# Patient Record
Sex: FEMALE | Race: WHITE | Employment: OTHER | URBAN - METROPOLITAN AREA
[De-identification: names, ages, dates, MRNs, and addresses within clinical notes are randomized per-mention and may not be internally consistent; named-entity substitution may affect disease eponyms.]

---

## 2021-01-19 PROBLEM — F10.929 ALCOHOLIC INTOXICATION WITH COMPLICATION (HCC): Status: ACTIVE | Noted: 2021-01-19

## 2021-01-19 PROBLEM — E87.6 HYPOKALEMIA: Status: ACTIVE | Noted: 2021-01-19

## 2021-01-19 PROBLEM — U07.1 COVID-19 VIRUS INFECTION: Status: ACTIVE | Noted: 2021-01-19

## 2021-01-19 PROBLEM — R45.851 SUICIDAL IDEATION: Status: ACTIVE | Noted: 2021-01-19

## 2021-01-19 NOTE — ED PROVIDER NOTES
Patient Seen in: [facility] Emergency Department      History   Patient presents with:  Eval-P  Eval-D    Stated Complaint: eval D    HPI/Subjective:   HPI    53-year-old female with history of anxiety, depression, alcohol abuse/dependence presents is awake, alert, well-appearing, in no acute distress. HEENT: Pupils equal round reactive to light, extraocular muscles are intact, there is no scleral icterus. Mucous membranes are moderately dry, oropharynx is clear, uvula midline.     Scalp is atraumat - Abnormal; Notable for the following components:    Lymphocyte Absolute 0.88 (*)     All other components within normal limits   LIPASE - Normal   MAGNESIUM - Normal   CBC WITH DIFFERENTIAL WITH PLATELET    Narrative:      The following orders were created

## 2021-01-19 NOTE — ED NOTES
All of the patients belongings were removed  They were bagged/labeled and put into 2 smart lock bags  RN removed the belongings  Bag #1 ! [id number]  This bag includes   #[employer] bag-bag was not gone through  Bag #2 #[id number]  This bag includes   #s

## 2021-01-19 NOTE — ED INITIAL ASSESSMENT (HPI)
PT TO ED FROM HOME WITH C/O ALCOHOL ABUSE, PT STATES HE HASN'T EATEN IN APPROX 2 MONTHS, AND LAST DRINK WAS THIS AM; VODKA. PT STATES SHE DRINKS A \"BIG BOTTLE\" OF VODKA A WEEK. HX OF DETOX PROGRAMS, DENIES SEIZURES.

## 2021-01-20 ENCOUNTER — APPOINTMENT (OUTPATIENT)
Dept: GENERAL RADIOLOGY | Facility: HOSPITAL | Age: 76
DRG: 896 | End: 2021-01-20
Attending: INTERNAL MEDICINE
Payer: MEDICARE

## 2021-01-20 PROCEDURE — 71045 X-RAY EXAM CHEST 1 VIEW: CPT | Performed by: INTERNAL MEDICINE

## 2021-01-20 NOTE — PLAN OF CARE
A/O x 3-4. Forgetful at times.  1:1 sitter for SI, per pt not planning on acting on thoughts, but feeling depressed due to drinking binge/alcohol dependency  CIWA Q 2- Ativan PRN scoring equal or over 7  C/O headache and sore throat  Psychiatry to see  [facility]  - Evaluate fluid balance, assess for edema, trend weights  Outcome: Progressing  Goal: Absence of cardiac arrhythmias or at baseline  Description: INTERVENTIONS:  - Continuous cardiac monitoring, monitor vital signs, obtain 12 lead EKG if indicated as a liaison between patient and family and health care team  - Initiate Consults from Ethics, [address] or initiate [address] as is appropriate  Outcome: Progressing     Problem: SELF HARM  Goal: Patient will be protected from self-harm

## 2021-01-20 NOTE — PROGRESS NOTES
PSYCH CONSULT    Date of Admission: 1/19/21  Date of Consult: 1/20/21  Reason for Consultation: Suicide precautions    Impression:  Primary Psychiatric Diagnosis:  Passive suicidal ideation while intoxicated on 1/19/21.      Anxiety and depressive disorder

## 2021-01-20 NOTE — CONSULTS
[facility]  Report of Psychiatric Consultation    Kavya Akashcheco Patient Status:  Inpatient    1945 MRN [id number]   [facility] 4NW-A Attending Aleksandra Rebollar MD   Hosp Day # 1 PCP CHAVO MCHUGH, FREDRICK     Date of Admission: 21 Ativan and was drowsy and unable to give a lot of history. Psychiatry Review Systems: No voices or visions. No elevated mood, racing thoughts, decreased need for sleep, grandiose thoughts. Past Psychiatric History: Anxiety and depressive disorder. logical  Thought content: no delusions  Perceptions: no hallucinations  Associations: Intact    Orientation: oriented person, place and oriented situation  Attention and Concentration:   fair  Memory:  intact recent and intact remote  Language: Intact preston

## 2021-01-20 NOTE — PROGRESS NOTES
NURSING ADMISSION NOTE      Patient admitted via Cart  Oriented to room. Safety precautions initiated. Bed in low position. Call light in reach.     Completed admission assessment with patient  1:1 observation in place  Observing patient for ETOH wit

## 2021-01-20 NOTE — H&P
[facility]  History and Physical     Travis Ghosh Patient Status:  Emergency    1945 MRN [id number]   Location [address] Attending Milymichelle Enrique, [address] [facility] Day # 0 PCP FREDRICK TONY MD     Chief Complaint: anxiet comprehensive 14 point review of systems was completed. Pertinent positives and negatives noted in the HPI.     Physical Exam:    /71   Pulse 98   Temp 97.8 °F (36.6 °C) (Temporal)   Resp 25   Ht 5' 9\" (1.753 m)   Wt 145 lb (65.8 kg)   SpO2 91% patient    Rachelle Hall MD  1/19/2021

## 2021-01-20 NOTE — PLAN OF CARE
Received pt at shift change. Pt has had labile CIWA numbers and treated appropriately. Pt has been up to [facility] tolerated well. Encouraged to drink more fluids, verbalizes understanding.  Asked pt if she has a plan to commit suicide, she denies, stated she has demonstrate appropriate behavior  Description: INTERVENTIONS:  - Treat underlying conditions and offer medication as ordered  - Educate regarding involuntary admission procedures and rules  - Contain excessive/inappropriate behavior per unit and hospital p

## 2021-01-20 NOTE — DIETARY NOTE
[address]     Admitting diagnosis:  Suicidal ideation [R45.851]  Hypokalemia [J65.4]  Alcoholic intoxication with complication ([facility] [address] [F63.431]  COVID-19 virus infection [U07.1]    Ht: 175.3 cm (5' 9\")  Wt: 65.

## 2021-01-21 NOTE — PROGRESS NOTES
[facility]  Progress Note    Valerie Resendiz Patient Status:  Inpatient    1945 MRN [id number]   [facility] 4NW-A Attending Elba Whatley MD   Hosp Day # 2 PCP CHAVO MCHUGH, FREDRICK     CC: Alcohol withdrawal, anxiety    SUBJECTIVE: Fe ALB 3.0 01/21/2021    ALKPHO 58 01/21/2021    BILT 0.5 01/21/2021    TP 6.2 01/21/2021    AST 33 01/21/2021    ALT 24 01/21/2021    DDIMER 0.55 01/21/2021    CRP 2.53 01/21/2021     COVID-19 Lab Results    COVID-19  Lab Results   Component Value Date    CO anxiety  2. COVID-19 pneumonia with hypoxia  1. Follow Covid inflammatory markers-ferritin and CRP higher than yesterday  2. Frequent proning  3. Incentive spirometry every hour while awake  4. ID consult  5.  Patient currently on 1 L of oxygen by nasal can

## 2021-01-21 NOTE — COVID NURSING ASSESSMENT
COVID-19 Daily Discharge Readiness-Nursing    O2 Sat at Rest:    96 %   O2 Sat with Exertion:    % on    liters   Temperature max from last 24 hrs: Temp (24hrs), Av °F (36.7 °C), Min:97.5 °F (36.4 °C), Max:98.2 °F (36.8 °C)    Inflammatory Markers:   R

## 2021-01-21 NOTE — PROGRESS NOTES
[facility]  Progress Note    Amy Lowe Patient Status:  Inpatient    1945 MRN [id number]   [facility] 4NW-A Attending Josi Motley MD   Hosp Day # 1 PCP CHAVO MCHUGH, FREDRICK     CC: Alcohol withdrawal, anxiety    SUBJECTIVE:  A 01/19/2021    ALT 28 01/19/2021    PTT 27.9 01/19/2021    INR 1.01 01/19/2021    PTP 13.6 01/19/2021    DDIMER 0.95 01/20/2021    CRP 1.10 01/20/2021    MG 1.6 01/20/2021    TROP <0.045 01/20/2021    CK 73 01/19/2021     COVID-19 Lab Results    COVID-19  L resolved          Quality:  · DVT Prophylaxis: SCD, subcutaneous Lovenox  · CODE status: full  · [place]: no  · Central line: no    Will the patient be referred to [facility] on discharge?:  To be decided  Estimated date of discharge:  To be decided  Discharge is de

## 2021-01-21 NOTE — PHYSICAL THERAPY NOTE
PHYSICAL THERAPY QUICK EVALUATION - INPATIENT    Room Number: 447/455-H  Evaluation Date: 1/21/2021  Presenting Problem: +COVID19, ETOH withdrawal  Physician Order: PT Eval and Treat    Problem List  Principal Problem:    Alcoholic intoxication with comp Moving from lying on back to sitting on the side of the bed?: None   How much help from another person does the patient currently need. ..   -   Moving to and from a bed to a chair (including a wheelchair)?: None   -   Need to walk in hospital room?: A Sonu previous, functional level

## 2021-01-21 NOTE — PLAN OF CARE
Guadalupe Riedel   COVID-19 Daily Discharge Readiness-Nursing    O2 Sat at Rest:    96 %   O2 Sat with Exertion:  92  % on  RA  Temperature max from last 24 hrs: Temp (24hrs), Av.2 °F (36.8 °C), Min:98 °F (36.7 °C), Max:98.4 °F (36.9 °C)    Inflammatory Markers:   Rec

## 2021-01-22 NOTE — COVID NURSING ASSESSMENT
COVID-19 Daily Discharge Readiness-Nursing    O2 Sat at Rest:     %   O2 Sat with Exertion:    % on    liters   Temperature max from last 24 hrs: Temp (24hrs), Av.2 °F (36.8 °C), Min:97.7 °F (36.5 °C), Max:98.8 °F (37.1 °C)    Inflammatory Markers:   R

## 2021-01-22 NOTE — COVID NURSING ASSESSMENT
COVID-19 Daily Discharge Readiness-Nursing    O2 Sat at Rest:     96%  On room air  O2 Sat with Exertion:    % on    liters   Temperature max from last 24 hrs: Temp (24hrs), Av.2 °F (36.8 °C), Min:97.7 °F (36.5 °C), Max:98.8 °F (37.1 °C)    Inflammator No data recorded     Safety Concerns: SI   Barriers to Discharge: mental state

## 2021-01-22 NOTE — PROGRESS NOTES
[facility]  Report of Psychiatric Progress Note    Alicia Bloch Patient Status:  Inpatient    1945 MRN [id number]   [facility] 4NW-A Attending Isela Bardales MD   Hosp Day # 3 PCP CHAVO MCHUGH, FREDRICK     Date of Admission: 21 incidental finding of her COVID-19 infection. She was intoxicated () when she had the suicidal ideation. She has NO suicidal ideation at this time. She admits to drinking 4 large glasses of vodka daily, but would not quantify further.  She drinks wit SURGERY Left      No family history on file. reports that she has quit smoking. She has never used smokeless tobacco. She reports current alcohol use of about 3.0 standard drinks of alcohol per week. She reports that she does not use drugs.     Allergies: 01/22/2021    HCT 39.4 01/22/2021    .0 01/22/2021    CREATSERUM 0.69 01/22/2021    BUN 6 01/22/2021     01/22/2021    K 3.6 01/22/2021     01/22/2021    CO2 31.0 01/22/2021    GLU 87 01/22/2021    CA 9.0 01/22/2021    ALB 3.1 01/22/2021

## 2021-01-22 NOTE — OCCUPATIONAL THERAPY NOTE
OCCUPATIONAL THERAPY QUICK EVALUATION - INPATIENT    Room Number: 908/600-X  Evaluation Date: 1/22/2021     Type of Evaluation: Quick Eval  Presenting Problem: ETOH withdrawal, COVID-19    Physician Order: IP Consult to Occupational Therapy  Reason for The strength is within functional limits     NEUROLOGICAL FINDINGS                   ACTIVITY TOLERANCE                         O2 SATURATIONS                ACTIVITIES OF DAILY LIVING ASSESSMENT  AM-PAC ‘6-Clicks’ Inpatient Daily Activity Short Form   How muc Profile/Medical History  LOW - Brief history including review of medical or therapy records    Specific performance deficits impacting engagement in ADL/IADL  LOW  1 - 3 performance deficits    Client Assessment/Performance Deficits  LOW - No comorbidities

## 2021-01-22 NOTE — PROGRESS NOTES
[facility]  Progress Note    [facility]    1945 MRN [id number]   [facility] 4NW-A Attending Emanuel Romero MD   Hosp Day # 3 PCP CHAVO MCHUGH, FREDRICK     CC: Alcohol withdrawal, anxiety    SUBJECTIVE: Pa Mood and affect appears normal      Labs:   Lab Results   Component Value Date    WBC 6.2 01/22/2021    HGB 13.0 01/22/2021    HCT 39.4 01/22/2021    .0 01/22/2021    CREATSERUM 0.69 01/22/2021    BUN 6 01/22/2021     01/22/2021    K 3.6 01/22 depression disorder with suicidal ideation  1. Patient off CIWA protocol  2.  Suicide precautions discontinued by psychiatry Dr. Alicja Jones again on 1/22/2021 as no suicidal ideations at this time per psychiatrist.  3. Seen by psych Dr. Alicja Jones on consult,   Lexapro

## 2021-01-22 NOTE — PROGRESS NOTES
[facility]  Report of Psychiatric Consultation    Ness Mishra Patient Status:  Inpatient    1945 MRN [id number]   [facility] 4NW-A Attending Raz Murillo MD   Hosp Day # 2 PCP CHAVO MCHUGH, FREDRICK     Date of Admission: 21 anxiety and depression. She just got Ativan and was drowsy and unable to give a lot of history.      Interval Hx:  1/21/21- She feels ANXIOUS about having COVID and worries about her age and \"poor immune system because I don't eat healthy\" worsening her p mg, Oral, Q1H PRN **OR** LORazepam (ATIVAN) injection 2 mg, 2 mg, Intravenous, Q1H PRN    Review of Systems   Constitutional: Positive for malaise/fatigue. Psychiatric/Behavioral: Positive for depression. The patient is nervous/anxious.       Mental Statu

## 2021-01-23 NOTE — COVID NURSING ASSESSMENT
Travis Gonzales   COVID-19 Daily Discharge Readiness-Nursing    O2 Sat at Rest:    94 %   O2 Sat with Exertion:    % on    liters   Temperature max from last 24 hrs: Temp (24hrs), Av.5 °F (36.9 °C), Min:97.9 °F (36.6 °C), Max:99.2 °F (37.3 °C)    Inflammatory Markers

## 2021-01-23 NOTE — PLAN OF CARE
COVID-19 Daily Discharge Readiness-Nursing    O2 Sat at Rest: 98    %   O2 Sat with Exertion: 98    % on 0    liters   Temperature max from last 24 hrs: Temp (24hrs), Av.6 °F (37 °C), Min:98 °F (36.7 °C), Max:99.2 °F (37.3 °C)    Inflammatory Markers:

## 2021-01-23 NOTE — CONSULTS
INFECTIOUS DISEASE CONSULTATION    Travis Ghosh Patient Status:  Inpatient    1945 MRN [id number]   [facility] 4NW-A Attending Liana Grover MD   Hosp Day # 4 PCP CHAVO MCHUGH, [facility] 1/19/2021 ), Disp: 30 tablet, Rfl: 5    •  TraZODone HCl (DESYREL) 50 MG Oral Tab, 1 tablet as needed. , Disp: , Rfl: 0        Review of Systems:    A comprehensive 10 point review of systems was completed.   Pertinent positives and negatives noted in the th esophagitis presence not specified     Lack of appetite     Alcoholic intoxication with complication ([facility] [address]     [id number]     Suicidal ideation     Hypokalemia     COVID-19 virus infection     Substance induced mood disorder (HCC)     Alcohol withdrawal synd

## 2021-01-23 NOTE — PROGRESS NOTES
[facility]  Progress Note    Jenna Mercado Patient Status:  Inpatient    1945 MRN [id number]   [facility] 4NW-A Attending Agustin Rodriguez MD   Hosp Day # 4 PCP CHAVO MCHUGH, FREDRICK     CC: Alcohol withdrawal, anxiety    SUBJECTIVE: Co ALB 3.3 01/23/2021    ALKPHO 67 01/23/2021    BILT 0.7 01/23/2021    TP 7.0 01/23/2021    AST 34 01/23/2021    ALT 26 01/23/2021     COVID-19 Lab Results    COVID-19  Lab Results   Component Value Date    COVID19 Detected (A) 01/19/2021       Pro-Calcit Covid inflammatory markers-ferritin and CRP higher than yesterday  2. Frequent proning  3. Incentive spirometry every hour while awake  4. ID consult  5. Patient currently on room air  6. Blood culture with no growth  3. Hypokalemia on admission  1.  Replac

## 2021-01-24 NOTE — PROGRESS NOTES
[facility]                INFECTIOUS DISEASE PROGRESS NOTE    Deep Ríos Patient Status:  Inpatient    1945 MRN [id number]   [facility] 4NW-A Attending Leilani Sharpe MD   Hosp Day # 5 PCP CHAVO MCHUGH, Marco Mei [facility] Encounter on 01/19/21   1.  BLOOD CULTURE     Status: None (Preliminary result)    Collection Time: 01/19/21 11:18 PM    Specimen: Blood,peripheral   Result Value Ref Range    Blood Culture Result No Growth 4 Days N/A

## 2021-01-24 NOTE — COVID NURSING ASSESSMENT
Jane Samuels   COVID-19 Daily Discharge Readiness-Nursing    O2 Sat at Rest: 98     %   O2 Sat with Exertion:   95 % on    RA  Temperature max from last 24 hrs: Temp (24hrs), Av.9 °F (36.6 °C), Min:97.1 °F (36.2 °C), Max:98.7 °F (37.1 °C)    Inflammatory Markers:

## 2021-01-24 NOTE — COVID NURSING ASSESSMENT
COVID-19 Daily Discharge Readiness-Nursing    O2 Sat at Rest:     96% room air  O2 Sat with Exertion:    % on    liters   Temperature max from last 24 hrs: Temp (24hrs), Av.2 °F (36.8 °C), Min:97.9 °F (36.6 °C), Max:98.7 °F (37.1 °C)    Inflammatory Ma

## 2021-01-24 NOTE — PROGRESS NOTES
[facility]  Progress Note     Ness Rifnatalie Patient Status:  Inpatient    1945 MRN [id number]   [facility] 4NW-A Attending Lakisha Estrada MD   Hosp Day # 5 PCP FREDRICK TONY MD     Chief Complaint: confusion    S: Patient feels data reviewed in Epic. Medications:   • chlordiazePOXIDE HCl  10 mg Oral TID   • escitalopram  20 mg Oral Daily   • enoxaparin  40 mg Subcutaneous Daily       ASSESSMENT / PLAN:     1. Alcohol abuse  2. alcohol intoxication   3.  alcohol withdrawal;  4.

## 2021-01-25 NOTE — COVID NURSING ASSESSMENT
COVID-19 Daily Discharge Readiness-Nursing    O2 Sat at Rest:   96  % on RA  O2 Sat with Exertion:    % on    liters   Temperature max from last 24 hrs: Temp (24hrs), Av °F (36.7 °C), Min:97.1 °F (36.2 °C), Max:98.7 °F (37.1 °C)    Inflammatory Markers

## 2021-01-25 NOTE — PROGRESS NOTES
[facility]  Report of Psychiatric Progress Note    Latasha Nettles Patient Status:  Inpatient    1945 MRN [id number]   [facility] 4NW-A Attending Coy Winters MD   Hosp Day # 6 PCP CHAVO MCHUGH, FREDRICK     Date of Admission: 21 She also smokes cannabis. She feels depressed and has low energy and motivation and feelings of hopelessness. She is on Lexapro for her anxiety and depression. She just got Ativan and was drowsy and unable to give a lot of history.      Interval Hx:  1/21/2 of about 3.0 standard drinks of alcohol per week. She reports that she does not use drugs.     Allergies:  No Known Allergies    Medications:    Current Facility-Administered Medications:   •  Calcium Carbonate Antacid (TUMS) chewable tab 500 mg, 500 mg, Or 6.7 01/25/2021    AST 25 01/25/2021    ALT 26 01/25/2021

## 2021-01-25 NOTE — COVID NURSING ASSESSMENT
COVID-19 Daily Discharge Readiness-Nursing    O2 Sat at Rest:     %   O2 Sat with Exertion:    % on    liters   Temperature max from last 24 hrs: Temp (24hrs), Av.2 °F (36.8 °C), Min:98 °F (36.7 °C), Max:98.6 °F (37 °C)    Inflammatory Markers:   Recen

## 2021-01-26 NOTE — COVID NURSING ASSESSMENT
COVID-19 Daily Discharge Readiness-Nursing    O2 Sat at Rest:   96  % on RA   O2 Sat with Exertion:    % on    liters   Temperature max from last 24 hrs: Temp (24hrs), Av.1 °F (36.7 °C), Min:97.5 °F (36.4 °C), Max:98.3 °F (36.8 °C)    Inflammatory Beacher Settles

## 2021-01-26 NOTE — PLAN OF CARE
Pt. A&O x4[place] [id number] Erika Guzman to discharge per MD's. Prescriptions filled by meds to beds. Discharge instructions given. All resources for outpatient therapy given to patient. Patient verbalized understanding. All questions answered. IV removed.  All be concerns of patient and caregivers  - Reduce environmental stimuli, as able  - Instruct patient/family in relaxation techniques, as appropriate  - Assess for spiritual and psychosocial needs and initiate Spiritual Care or Behavioral Health consult as neede

## 2021-01-26 NOTE — PROGRESS NOTES
[facility]  Report of Psychiatric Progress Note    Ericka Stagers Patient Status:  Inpatient    1945 MRN [id number]   [facility] 4NW-A Attending Dalila Horner MD   Hosp Day # 7 PCP CHAVO MCHUGH, FREDRICK     Date of Admission: 21 daily, but would not quantify further. She drinks with her boyfriend of 30 yrs. She wants to quit drinking because it \"it is getting to much\". She also smokes cannabis. She feels depressed and has low energy and motivation and feelings of hopelessness.  [employer] [place] members. Social and Developmental History: She has a boyfriend of 30 yrs.      Past Medical History:   Diagnosis Date   • Anxiety    • Depression      Past Surgical History:   Procedure Laterality Date   • BREAST SURGERY     •      • HIP SURGERY fair now  Judgment: fair now

## 2021-01-28 NOTE — DISCHARGE SUMMARY
[facility]  DISCHARGE SUMMARY     Raven Lr Patient Status:  Inpatient    1945 MRN [id number]   [facility] 4NW-A Attending No att. providers found   [facility] Day # 7 PCP FREDRICK TONY MD     Date of Admission: 2021  Date hours as needed for Nausea.    Quantity: 30 tablet  Refills: 0        CHANGE how you take these medications      Instructions Prescription details   escitalopram 20 MG Tabs  Commonly known as: LEXAPRO  What changed:   · medication strength  · how much to ta chart    Cali Medeiros MD    Time spent:  > 30 minutes

## 2024-03-25 PROBLEM — F10.10 ALCOHOL ABUSE: Status: ACTIVE | Noted: 2024-03-25

## 2024-03-25 PROBLEM — K92.1 MELANOTIC STOOLS: Status: ACTIVE | Noted: 2024-03-25

## 2024-03-26 ENCOUNTER — APPOINTMENT (OUTPATIENT)
Dept: ULTRASOUND IMAGING | Facility: HOSPITAL | Age: 79
End: 2024-03-26
Attending: INTERNAL MEDICINE
Payer: MEDICARE

## 2024-03-26 PROCEDURE — 76700 US EXAM ABDOM COMPLETE: CPT | Performed by: INTERNAL MEDICINE

## 2025-01-07 ENCOUNTER — APPOINTMENT (OUTPATIENT)
Dept: CT IMAGING | Age: 80
End: 2025-01-07
Attending: EMERGENCY MEDICINE
Payer: MEDICARE

## 2025-01-07 ENCOUNTER — HOSPITAL ENCOUNTER (INPATIENT)
Facility: HOSPITAL | Age: 80
LOS: 10 days | Discharge: HOME HEALTH | End: 2025-01-17
Attending: EMERGENCY MEDICINE | Admitting: HOSPITALIST
Payer: MEDICARE

## 2025-01-07 ENCOUNTER — APPOINTMENT (OUTPATIENT)
Dept: GENERAL RADIOLOGY | Age: 80
End: 2025-01-07
Attending: EMERGENCY MEDICINE
Payer: MEDICARE

## 2025-01-07 DIAGNOSIS — D64.9 ANEMIA, UNSPECIFIED TYPE: ICD-10-CM

## 2025-01-07 DIAGNOSIS — S09.8XXA BLUNT HEAD TRAUMA, INITIAL ENCOUNTER: ICD-10-CM

## 2025-01-07 DIAGNOSIS — K92.2 GASTROINTESTINAL HEMORRHAGE, UNSPECIFIED GASTROINTESTINAL HEMORRHAGE TYPE: Primary | ICD-10-CM

## 2025-01-07 LAB
ALBUMIN SERPL-MCNC: 3.9 G/DL (ref 3.2–4.8)
ALBUMIN/GLOB SERPL: 1.5 {RATIO} (ref 1–2)
ALP LIVER SERPL-CCNC: 60 U/L
ALT SERPL-CCNC: 10 U/L
AMMONIA PLAS-MCNC: <10 UMOL/L (ref 11–32)
ANION GAP SERPL CALC-SCNC: 7 MMOL/L (ref 0–18)
ANTIBODY SCREEN: NEGATIVE
APTT PPP: 24.6 SECONDS (ref 23–36)
AST SERPL-CCNC: 23 U/L (ref ?–34)
BASOPHILS # BLD AUTO: 0.01 X10(3) UL (ref 0–0.2)
BASOPHILS NFR BLD AUTO: 0.2 %
BILIRUB SERPL-MCNC: 0.4 MG/DL (ref 0.2–1.1)
BUN BLD-MCNC: 10 MG/DL (ref 9–23)
CALCIUM BLD-MCNC: 9.5 MG/DL (ref 8.7–10.4)
CHLORIDE SERPL-SCNC: 107 MMOL/L (ref 98–112)
CO2 SERPL-SCNC: 27 MMOL/L (ref 21–32)
CREAT BLD-MCNC: 0.85 MG/DL
DEPRECATED HBV CORE AB SER IA-ACNC: 9 NG/ML
EGFRCR SERPLBLD CKD-EPI 2021: 71 ML/MIN/1.73M2 (ref 60–?)
EOSINOPHIL # BLD AUTO: 0.06 X10(3) UL (ref 0–0.7)
EOSINOPHIL NFR BLD AUTO: 1 %
ERYTHROCYTE [DISTWIDTH] IN BLOOD BY AUTOMATED COUNT: 15.3 %
ETHANOL SERPL-MCNC: <3 MG/DL (ref ?–3)
GLOBULIN PLAS-MCNC: 2.6 G/DL (ref 2–3.5)
GLUCOSE BLD-MCNC: 142 MG/DL (ref 70–99)
HCT VFR BLD AUTO: 23.7 %
HGB BLD-MCNC: 6.8 G/DL
HGB BLD-MCNC: 7.4 G/DL
IMM GRANULOCYTES # BLD AUTO: 0.01 X10(3) UL (ref 0–1)
IMM GRANULOCYTES NFR BLD: 0.2 %
INR BLD: 1.08 (ref 0.8–1.2)
IRON SATN MFR SERPL: 2 %
IRON SERPL-MCNC: 8 UG/DL
LYMPHOCYTES # BLD AUTO: 1.11 X10(3) UL (ref 1–4)
LYMPHOCYTES NFR BLD AUTO: 19.2 %
MCH RBC QN AUTO: 26.4 PG (ref 26–34)
MCHC RBC AUTO-ENTMCNC: 31.2 G/DL (ref 31–37)
MCV RBC AUTO: 84.6 FL
MONOCYTES # BLD AUTO: 0.46 X10(3) UL (ref 0.1–1)
MONOCYTES NFR BLD AUTO: 8 %
NEUTROPHILS # BLD AUTO: 4.12 X10 (3) UL (ref 1.5–7.7)
NEUTROPHILS # BLD AUTO: 4.12 X10(3) UL (ref 1.5–7.7)
NEUTROPHILS NFR BLD AUTO: 71.4 %
OSMOLALITY SERPL CALC.SUM OF ELEC: 293 MOSM/KG (ref 275–295)
PLATELET # BLD AUTO: 290 10(3)UL (ref 150–450)
POTASSIUM SERPL-SCNC: 3.1 MMOL/L (ref 3.5–5.1)
PROT SERPL-MCNC: 6.5 G/DL (ref 5.7–8.2)
PROTHROMBIN TIME: 13.8 SECONDS (ref 11.6–14.8)
RBC # BLD AUTO: 2.8 X10(6)UL
RH BLOOD TYPE: POSITIVE
RH BLOOD TYPE: POSITIVE
SODIUM SERPL-SCNC: 141 MMOL/L (ref 136–145)
TOTAL IRON BINDING CAPACITY: 347 UG/DL (ref 250–425)
TRANSFERRIN SERPL-MCNC: 270 MG/DL (ref 250–380)
TROPONIN I SERPL HS-MCNC: <3 NG/L
WBC # BLD AUTO: 5.8 X10(3) UL (ref 4–11)

## 2025-01-07 PROCEDURE — 99223 1ST HOSP IP/OBS HIGH 75: CPT | Performed by: INTERNAL MEDICINE

## 2025-01-07 PROCEDURE — 70450 CT HEAD/BRAIN W/O DYE: CPT | Performed by: EMERGENCY MEDICINE

## 2025-01-07 PROCEDURE — 30233N1 TRANSFUSION OF NONAUTOLOGOUS RED BLOOD CELLS INTO PERIPHERAL VEIN, PERCUTANEOUS APPROACH: ICD-10-PCS | Performed by: HOSPITALIST

## 2025-01-07 PROCEDURE — 71045 X-RAY EXAM CHEST 1 VIEW: CPT | Performed by: EMERGENCY MEDICINE

## 2025-01-07 PROCEDURE — 74177 CT ABD & PELVIS W/CONTRAST: CPT | Performed by: EMERGENCY MEDICINE

## 2025-01-07 RX ORDER — POTASSIUM CHLORIDE 1500 MG/1
20 TABLET, EXTENDED RELEASE ORAL ONCE
Status: COMPLETED | OUTPATIENT
Start: 2025-01-07 | End: 2025-01-07

## 2025-01-07 RX ORDER — SODIUM CHLORIDE 9 MG/ML
INJECTION, SOLUTION INTRAVENOUS CONTINUOUS
Status: ACTIVE | OUTPATIENT
Start: 2025-01-07 | End: 2025-01-08

## 2025-01-07 RX ORDER — MELATONIN
100 DAILY
Status: DISCONTINUED | OUTPATIENT
Start: 2025-01-08 | End: 2025-01-17

## 2025-01-07 RX ORDER — ALPRAZOLAM 0.25 MG/1
0.5 TABLET ORAL 3 TIMES DAILY PRN
Status: DISCONTINUED | OUTPATIENT
Start: 2025-01-07 | End: 2025-01-17

## 2025-01-07 RX ORDER — ALPRAZOLAM 1 MG/1
1 TABLET ORAL 3 TIMES DAILY
COMMUNITY
Start: 2024-10-29

## 2025-01-07 RX ORDER — ESCITALOPRAM OXALATE 20 MG/1
20 TABLET ORAL EVERY MORNING
Status: DISCONTINUED | OUTPATIENT
Start: 2025-01-08 | End: 2025-01-17

## 2025-01-07 RX ORDER — POLYETHYLENE GLYCOL 3350 17 G/17G
17 POWDER, FOR SOLUTION ORAL DAILY PRN
Status: DISCONTINUED | OUTPATIENT
Start: 2025-01-07 | End: 2025-01-17

## 2025-01-07 RX ORDER — BUTALBITAL, ACETAMINOPHEN AND CAFFEINE 50; 325; 40 MG/1; MG/1; MG/1
1 TABLET ORAL EVERY 4 HOURS PRN
Status: DISCONTINUED | OUTPATIENT
Start: 2025-01-07 | End: 2025-01-08

## 2025-01-07 RX ORDER — METOCLOPRAMIDE HYDROCHLORIDE 5 MG/ML
5 INJECTION INTRAMUSCULAR; INTRAVENOUS EVERY 8 HOURS PRN
Status: DISCONTINUED | OUTPATIENT
Start: 2025-01-07 | End: 2025-01-17

## 2025-01-07 RX ORDER — FOLIC ACID 1 MG/1
1 TABLET ORAL DAILY
Status: DISCONTINUED | OUTPATIENT
Start: 2025-01-08 | End: 2025-01-17

## 2025-01-07 RX ORDER — ONDANSETRON 2 MG/ML
4 INJECTION INTRAMUSCULAR; INTRAVENOUS EVERY 6 HOURS PRN
Status: DISCONTINUED | OUTPATIENT
Start: 2025-01-07 | End: 2025-01-17

## 2025-01-07 RX ORDER — SODIUM PHOSPHATE, DIBASIC AND SODIUM PHOSPHATE, MONOBASIC 7; 19 G/230ML; G/230ML
1 ENEMA RECTAL ONCE AS NEEDED
Status: DISCONTINUED | OUTPATIENT
Start: 2025-01-07 | End: 2025-01-17

## 2025-01-07 RX ORDER — ECHINACEA PURPUREA EXTRACT 125 MG
1 TABLET ORAL
Status: DISCONTINUED | OUTPATIENT
Start: 2025-01-07 | End: 2025-01-17

## 2025-01-07 RX ORDER — KETOROLAC TROMETHAMINE 15 MG/ML
15 INJECTION, SOLUTION INTRAMUSCULAR; INTRAVENOUS ONCE
Status: DISCONTINUED | OUTPATIENT
Start: 2025-01-07 | End: 2025-01-07

## 2025-01-07 RX ORDER — BISACODYL 10 MG
10 SUPPOSITORY, RECTAL RECTAL
Status: DISCONTINUED | OUTPATIENT
Start: 2025-01-07 | End: 2025-01-17

## 2025-01-07 RX ORDER — ACETAMINOPHEN 500 MG
500 TABLET ORAL EVERY 4 HOURS PRN
Status: DISCONTINUED | OUTPATIENT
Start: 2025-01-07 | End: 2025-01-17

## 2025-01-07 RX ORDER — DOXEPIN HYDROCHLORIDE 50 MG/1
1 CAPSULE ORAL DAILY
Status: DISCONTINUED | OUTPATIENT
Start: 2025-01-08 | End: 2025-01-17

## 2025-01-07 RX ORDER — ONDANSETRON 2 MG/ML
4 INJECTION INTRAMUSCULAR; INTRAVENOUS ONCE
Status: COMPLETED | OUTPATIENT
Start: 2025-01-07 | End: 2025-01-07

## 2025-01-07 RX ORDER — SODIUM CHLORIDE 9 MG/ML
INJECTION, SOLUTION INTRAVENOUS ONCE
Status: COMPLETED | OUTPATIENT
Start: 2025-01-07 | End: 2025-01-07

## 2025-01-07 RX ORDER — SODIUM CHLORIDE 9 MG/ML
INJECTION, SOLUTION INTRAVENOUS CONTINUOUS
Status: DISCONTINUED | OUTPATIENT
Start: 2025-01-07 | End: 2025-01-07

## 2025-01-07 RX ORDER — SENNOSIDES 8.6 MG
17.2 TABLET ORAL NIGHTLY PRN
Status: DISCONTINUED | OUTPATIENT
Start: 2025-01-07 | End: 2025-01-17

## 2025-01-07 RX ORDER — BENZONATATE 100 MG/1
200 CAPSULE ORAL 3 TIMES DAILY PRN
Status: DISCONTINUED | OUTPATIENT
Start: 2025-01-07 | End: 2025-01-17

## 2025-01-07 RX ORDER — HYDROCODONE BITARTRATE AND ACETAMINOPHEN 5; 325 MG/1; MG/1
1 TABLET ORAL EVERY 6 HOURS PRN
COMMUNITY
End: 2025-01-17

## 2025-01-07 NOTE — ED PROVIDER NOTES
Patient Seen in: [facility] In [place]      History     Chief Complaint   Patient presents with    Headache    Dizziness     Stated Complaint: headache, dizziness and increased weakness. pt reports she did fall 1 week ago *    Subjective:   HPI      Patient is a 79-year-old female presents emergency room with history of multiple complaints.  The patient reports 6 weeks ago she fell out of bed and hit her head on the end of the table.  The patient states that she slipped and fell and hit her head about a week ago as well denies any loss of consciousness at that time.  The patient had intermittent headaches since her second fall last week.  Patient had worsening headache since yesterday.  The patient is felt off balance and felt lightheaded since her first fall.  The patient denies any blunt abdominal trauma.  The patient does not take any blood thinners.  The patient does have a history of alcohol use on a regular basis but last drink she said on New 's.  The patient denies history of any vomiting or diarrhea.  The patient has noted some black-colored stools recently.  Patient denies history of any other somatic complaints or discomfort at this time.    Objective:     Past Medical History:    Alcohol abuse    Anxiety    Depression              Past Surgical History:   Procedure Laterality Date    Breast surgery            Hip surgery Left                 Social History     Socioeconomic History    Marital status:    Tobacco Use    Smoking status: Former    Smokeless tobacco: Never   Vaping Use    Vaping status: Never Used   Substance and Sexual Activity    Alcohol use: Yes     Alcohol/week: 3.0 standard drinks of alcohol     Types: 3 Standard drinks or equivalent per week     Comment: DAILY; + 30 YEARS , states last etoh was 25    Drug use: Never     Social Drivers of Health     Food Insecurity: No Food Insecurity (3/25/2024)    Food Insecurity     Food Insecurity: Never  true   Transportation Needs: No Transportation Needs (3/25/2024)    Transportation Needs     Lack of Transportation: No   Housing Stability: Low Risk  (3/25/2024)    Housing Stability     Housing Instability: No                  Physical Exam     ED Triage Vitals [01/07/25 1044]   /68   Pulse 87   Resp 14   Temp 98.6 °F (37 °C)   Temp src Temporal   SpO2 100 %   O2 Device None (Room air)       Current Vitals:   Vital Signs  BP: 143/60  Pulse: 91  Resp: 16  Temp: 98.6 °F (37 °C)  Temp src: Temporal    Oxygen Therapy  SpO2: 98 %  O2 Device: None (Room air)        Physical Exam     GENERAL: Well-developed, well-nourished female sitting up breathing easily in no apparent distress.  Patient is nontoxic in appearance.  HEENT: Head is normocephalic, atraumatic. Pupils are 4 mm equally round and reactive to light.  Conjunctivae are somewhat pale.  Oropharynx is clear. Mucous membranes are moist.  NECK: There is no focal tenderness to palpation appreciated.   LUNGS: Clear to auscultation bilaterally with no wheeze. There is good equal air entry bilaterally.  HEART: Regular rate and rhythm. Normal S1, S2 no S3, or S4. No murmur.  ABDOMEN: There is no focal tenderness to palpation appreciated anywhere throughout the abdomen. There is no guarding, no rebound, no mass, and no organomegaly appreciated. There is normoactive bowel sounds.   BACK: There is no focal midline tenderness palpation throughout the thoracic or lumbar spinous processes.  There is no overlying ecchymosis or rash appreciated.  RECTAL: There is black stool which is strongly Hemoccult positive no gross blood appreciated.  EXTREMITIES: There is no cyanosis, clubbing, or edema appreciated. Pulses are 2+ and equal in all 4 extremities.  NEURO: Patient is awake, alert and oriented to time place and person. Motor strength is 5 over 5 in all 4 extremities. There are no gross motor or sensory deficits appreciated. Cranial nerves II through XII are intact.   Patient answering all questions appropriately.        ED Course     Labs Reviewed   CBC WITH DIFFERENTIAL WITH PLATELET - Abnormal; Notable for the following components:       Result Value    RBC 2.80 (*)     HGB 7.4 (*)     HCT 23.7 (*)     All other components within normal limits   COMP METABOLIC PANEL (14) - Abnormal; Notable for the following components:    Glucose 142 (*)     Potassium 3.1 (*)     All other components within normal limits   TROPONIN I HIGH SENSITIVITY - Normal   PROTHROMBIN TIME (PT) - Normal   PTT, ACTIVATED - Normal   ETHYL ALCOHOL   TYPE AND SCREEN    Narrative:     The following orders were created for panel order Type and screen.  Procedure                               Abnormality         Status                     ---------                               -----------         ------                     ABORH (Blood Type)[[id number]]                               Final result               Antibody Screen[[id number]]                                  Final result                 Please view results for these tests on the individual orders.   ABORH (BLOOD TYPE)   ANTIBODY SCREEN   ABORH CONFIRMATION   RAINBOW DRAW LAVENDER   RAINBOW DRAW LIGHT GREEN   RAINBOW DRAW BLUE     EKG    Rate, intervals and axes as noted on EKG Report.  Rate: 90  Rhythm: Sinus Rhythm  Reading: No acute ischemic change noted                CT ABDOMEN+PELVIS(CONTRAST ONLY)(CPT=74177)    Result Date: 1/7/2025  CONCLUSION:   1. No acute process identified within the abdomen or pelvis.  2. Mild wedge compression deformities of the T12 and L1 vertebral bodies without paravertebral stranding, probably chronic, though exact chronicity indeterminate.    LOCATION:  [id number]   Dictated by (CST): Shakira Ulloa MD on 1/07/2025 at 12:10 PM     Finalized by (CST): Shakira Ulloa MD on 1/07/2025 at 12:15 PM       CT BRAIN OR HEAD (CPT=70450)    Result Date: 1/7/2025  CONCLUSION:   1. Negative for acute intracranial process.    LOCATION:   [id number]   Dictated by (CST): Shakira Ulloa MD on 1/07/2025 at 12:08 PM     Finalized by (CST): Shakira Ulloa MD on 1/07/2025 at 12:10 PM       XR CHEST AP PORTABLE  (CPT=71045)    Result Date: 1/7/2025  CONCLUSION:  1. Large hiatal hernia with dependent atelectasis left lower lobe.  This is a stable finding. 2. There is no other acute abnormality.   LOCATION:  [facility]      Dictated by (CST): Jaxson Bullock MD on 1/07/2025 at 11:37 AM     Finalized by (CST): Jaxson Bullock MD on 1/07/2025 at 11:38 AM             MDM      Patient had IV line established blood work drawn including a CBC, chemistries, BUN/creatinine, and blood sugar showed to have a hemoglobin of 7.4 her hemoglobin last month as per my review of outpatient medical records was 12.2 her BUN and creatinine are normal today her potassium was somewhat low at 3.1 for which she was given oral potassium in the ER.  Troponin found to be negative.  Coags are unremarkable.  The patient was given IV fluid and IV Protonix in the emergency room.  The patient was typed and screened here in the ER.  Alcohol level is pending at the time of this dictation.  Patient was admitted for similar symptoms back in March of last year at which time she was seen by duly MALISSA as per my review of medical records.  The patient will be admitted to the hospital for further workup at this time.  Patient's case discussed with the [facility] hospitalist as well as Dr. Holt from GI.  The patient will be kept n.p.o. at this time.  Patient kept on IV fluids she will need additional workup at this time.  Patient transferred to the hospital for further treatment at this time.  Patient remained awake, alert, and appropriate throughout the ER stay.    Admission disposition: 1/7/2025 12:20 PM           Medical Decision Making      Disposition and Plan     Clinical Impression:  1. Gastrointestinal hemorrhage, unspecified gastrointestinal hemorrhage type    2. Anemia, unspecified type    3. Blunt head trauma,  initial encounter         Disposition:  Admit  1/7/2025 12:20 pm    Follow-up:  No follow-up provider specified.        Medications Prescribed:  Current Discharge Medication List              Supplementary Documentation:         Hospital Problems       Present on Admission             ICD-10-CM Noted POA    * (Principal) Gastrointestinal hemorrhage, unspecified gastrointestinal hemorrhage type K92.2 1/7/2025 Unknown

## 2025-01-07 NOTE — ED INITIAL ASSESSMENT (HPI)
Reports 6 weeks ago she fell out of bed and hit her head on the end table.  A week ago she slipped and fell and hit her head.denies loc at that time.  Reports intermittent headaches since second fall.  States worsening of headache yesterday..  Reports \"off balance\" dizziness since first fall.

## 2025-01-07 NOTE — ED QUICK NOTES
Orders for admission, patient is aware of plan and ready to go upstairs. Any questions, please call ED AXEL Magaña at extension [phone].     Patient Covid vaccination status: Unvaccinated     COVID Test Ordered in ED: None    COVID Suspicion at Admission: N/A    Running Infusions:  None    Mental Status/LOC at time of transport: AOx3    Other pertinent information:   CIWA score: N/A   NIH score:  0

## 2025-01-07 NOTE — H&P
[facility]  History and Physical     Georgia Thompson Patient Status:  Emergency    1945 MRN [id number]   Location [facility] IN [place] Attending Alek Ospina MD   Hosp Day # 0 PCP CHAVO MCHUGH, FREDRICK     Chief Complaint: Dizzy, weak, dark stool    Subjective:    History of Present Illness:   Georgia Thompson is a 79 year old female with PMHx PMHx alcohol abuse disorder, anxiety and depression to the hospital with headache, dizziness, generalized weakness and dark stool.  She had a fall last week when she slipped and hit her head.  She denies any loss of consciousness, neck pain or back pain.  She has had intermittent headaches since then but worsened yesterday.  She reports her last drink was on New Year's.  She has intermittent nausea with a few episodes of hematemesis 1 week ago.  She states she stopped heavy drinking 2.5 years ago.  She denies any abdominal pain.  In the ER she had a potassium of 3.1.  Hemoglobin was 7.4, down from 12.3 in 2024.  X-ray showed stable large hiatal hernia.  CT head was negative for acute surgical process.  CT abdomen his pelvis showed no acute process.  Patient was given 1 L normal saline bolus, potassium, IV PPI and Zofran and subsequently admitted with GI consult.    History/Other:    Past Medical History:  Past Medical History:    Alcohol abuse    Anxiety    Depression     Past Surgical History:   Past Surgical History:   Procedure Laterality Date    Breast surgery            Hip surgery Left       Family History:   No family history on file.  Social History:    reports that she has quit smoking. She has never used smokeless tobacco. She reports current alcohol use of about 3.0 standard drinks of alcohol per week. She reports that she does not use drugs.     Allergies: Allergies[1]    Medications:  Medications Ordered Prior to Encounter[2]    Review of Systems:   A comprehensive review of systems was completed.    Pertinent  positives and negatives noted in the HPI.    Objective:   Physical Exam:    /60   Pulse 91   Temp 98.6 °F (37 °C) (Temporal)   Resp 16   Ht 5' 10\" (1.778 m)   Wt 150 lb (68 kg)   SpO2 98%   BMI 21.52 kg/m²   General: No acute distress, awake and alert  Respiratory: No rhonchi, no wheezes  Cardiovascular: S1, S2. Regular rate and rhythm  Abdomen: Soft, Non-tender, non-distended, positive bowel sounds  Neuro: FOSTER x 4  Extremities: No edema    Results:    Labs:      Labs Last 24 Hours:  Recent Labs   Lab 01/07/25  1046   RBC 2.80*   HGB 7.4*   HCT 23.7*   MCV 84.6   MCH 26.4   MCHC 31.2   RDW 15.3   NEPRELIM 4.12   WBC 5.8   .0     Recent Labs   Lab 01/07/25  1046   *   BUN 10   CREATSERUM 0.85   EGFRCR 71   CA 9.5   ALB 3.9      K 3.1*      CO2 27.0   ALKPHO 60   AST 23   ALT 10   BILT 0.4   TP 6.5     Estimated Glomerular Filtration Rate: 70.7 mL/min/1.73m2 (by CKD-EPI based on SCr of 0.85 mg/dL).    Lab Results   Component Value Date    INR 1.08 01/07/2025    INR 1.00 03/25/2024    INR 1.01 01/19/2021     Recent Labs   Lab 01/07/25  1046   TROPHS <3     No results for input(s): \"TROP\", \"PBNP\" in the last 168 hours.    No results for input(s): \"PCT\" in the last 168 hours.    Imaging: Imaging data reviewed in Epic.    Assessment & Plan:      #Acute blood loss anemia due to GI bleed given dark stool  #Large Hiatal hernia  -Monitor hgb every 8 hours and transfuse if under 7. Plan for one unit now as repeat hgb is 6.8  -IV PPI BID  -GI consult  -CLD, NPO at midnight for EGD/colonoscopy tomorrow  -Gentle IVF  -Check iron studies    #Falls, dizziness  -Unclear if related to above  -CT head unremarkable  -PT/OT    #Alcohol abuse disorder  -MVI, folic acid, thiamine  -Last drink was New Year's  -Monitor for withdrawal and if shows signs will start CIWA protocol    #Anxiety and depression   -Lexapro  -PRN xanax    #Hypokalemia  -Replace per protocol    #Headache  -CT head  negative      Plan of care discussed with patient, RN.    Babar Pandey, DO    Supplementary Documentation:     The 21st Century Cures Act makes medical notes like these available to patients in the interest of transparency. Please be advised this is a medical document. Medical documents are intended to carry relevant information, facts as evident, and the clinical opinion of the practitioner. The medical note is intended as peer to peer communication and may appear blunt or direct. It is written in medical language and may contain abbreviations or verbiage that are unfamiliar.                   [1] No Known Allergies  [2]   No current facility-administered medications on file prior to encounter.     Current Outpatient Medications on File Prior to Encounter   Medication Sig Dispense Refill    HYDROcodone-acetaminophen 5-325 MG Oral Tab Take 1 tablet by mouth every 6 (six) hours as needed for Pain.      folic acid 1 MG Oral Tab Take 1 tablet (1 mg total) by mouth daily. 30 tablet 0    thiamine 100 MG Oral Tab Take 1 tablet (100 mg total) by mouth daily. 30 tablet 0    metoclopramide 10 MG Oral Tab Take 1 tablet (10 mg total) by mouth 3 (three) times daily as needed. 15 tablet 0    ondansetron 4 MG Oral Tablet Dispersible Take 1 tablet (4 mg total) by mouth every 4 (four) hours as needed for Nausea. 15 tablet 0    ALPRAZolam 0.5 MG Oral Tab Take 1 tablet (0.5 mg total) by mouth 3 (three) times daily.      escitalopram 20 MG Oral Tab Take 1 tablet (20 mg total) by mouth daily. 30 tablet 0    Pantoprazole Sodium 40 MG Oral Tab EC Take 1 tablet (40 mg total) by mouth every morning before breakfast. 30 tablet 5

## 2025-01-07 NOTE — ED QUICK NOTES
Rounding Completed    Plan of Care reviewed. Waiting for admission.  Elimination needs assessed.    Bed is locked and in lowest position. Call light within reach.

## 2025-01-07 NOTE — CONSULTS
[facility]  Report of Consultation    Georgia Thompson Patient Status:  Emergency    1945 MRN [id number]   Location [place] EM[facility] DEPARTMENT IN [place] Attending Alek Ospnia MD   Hosp Day # 0 PCP CHAVO MCHUGH, FREDRICK     Reason for Consultation:  Melena  anemia    Georgia Thompson is a a(n) 79 year old female.      Hx of etoh abuse, iron deficiency, hiatal vs para esoph hernia, anemia    Some prior eval:     egd w/ Dr JUAN Lau had 6 cm hiatal hernia and gastric antral clean based cratered ulcers     seen by surgery for paraesophageal hernia, ugi 10/22/21 ([place]) had \" 50% of stomach above diaphragm c/w at least hiatal and probably compoent of paraesoph hernia \"    Pt has chart hx of iron deficiency anemia, 24 outpt note Dr Fofana comments on pt is to be on oral fe 2x/day and has chronic dark stool and meloxicam and pantoprazole 40 mg daily    Chart hx of egd since , I do not have these records.  Pt states she chose not to have surgery for the hiatal hernia         admit w/ heme (+) stool, seen by Dr Escobar            Current issues: anemia    Hx of falls over last 1-2 months, states did not hit head most recently.  States feeling unsteady on feet but denies marinelli, chest pain, sob to my interview    States \"I stopped all of my meds except pantoprazole about a week ago because of the alcoholism, it was hurting my stomach so I stopped all my other meds\". States  these symptoms resolve    Denies current abd pain, n/v, appetite issues, other gi symptoms     States chronic fe anemia, states had transfusions a few years ago but thinks it has been \"years\" perhaps 5-10 [facility], thinks may have had egd  or .    Denies overt ent, gi, gu, gyn, pulm or other overt bleeding    States stools intermittently dark while on fe, sometimes 1-2x/week but stool pattern/form/frequency unchanged and about 1x/day    24 hg 12.2, today is 7.4.  nl bun/cr and lfts and inr.    Denies any asa  , states has not been on mobic or nsaids for a few weeks.  States regular daily ppi    Ct abd/pelvis today w/ no acute process but compression deforities of t12/l1 (see report) and large hiatal hernia w/ stomach and portion of pancreas    Risk of asa/nsaids and etoh reviewed w/ her    Risk of hiatal hernia reviewed w/ her    States neuropathy is stable/mild    negative for - abdominal pain, appetite loss, blood in stools, gas/bloating, hematemesis, or nausea/vomiting    Risk of egd and colonoscopy including prep, sedation, bleeding, perforation, infection, miss rate or issues with accuracy, etc and possible morbidity/mortalty discussed.  We discussed risk, benefit, alternatives, limitations as well as not having the procedures.  All questions answered, pt demonstrated understanding.    Patient Active Problem List   Diagnosis    Anxiety    Panic attack    Chronic prescription benzodiazepine use    Anxiety and depression    Severe alcohol use disorder (HCC)    Former smoker    Gastroesophageal reflux disease, esophagitis presence not specified    Lack of appetite    Alcoholic intoxication with complication (HCC)    COVID-19    Suicidal ideation    Hypokalemia    COVID-19 virus infection    Substance induced mood disorder (HCC)    Alcohol withdrawal syndrome with complication (HCC)    Alcohol abuse    Melanotic stools    Gastrointestinal hemorrhage, unspecified gastrointestinal hemorrhage type    Anemia, unspecified type    Blunt head trauma, initial encounter         History:  Past Medical History:    Alcohol abuse    Anxiety    Depression       Past Surgical History:   Procedure Laterality Date    Breast surgery            Hip surgery Left        History reviewed. No pertinent family history.     reports that she has quit smoking. She has never used smokeless tobacco. She reports current alcohol use of about 3.0 standard drinks of alcohol per week. She reports that she does not use  drugs.    Allergies:  Allergies[1]    Medications:  Current Facility-Administered Medications   Medication Dose Route Frequency    sodium chloride 0.9% infusion   Intravenous Continuous    pantoprazole (Protonix) 40 mg in sodium chloride 0.9% PF 10 mL IV push  40 mg Intravenous Q12H    polyethylene glycol-electrolyte (Golytely) 236 g oral solution 4,000 mL  4,000 mL Oral Once       Medications Ordered Prior to Encounter[2]      Review of Systems:   SKIN: denies any unusual skin lesions or rashes   RESPIRATORY: denies new/changing shortness of breath     CARDIOVASCULAR: denies chest pain or FRYE; no palpitations    GI: as above  GENITAL//GYN: denies hematuria  MUSCULOSKELETAL: denies new joint issues, states chronic arthritis  NEURO: chronic neuropathy she states is mild  PSYCHE: mood is unchanged a except as stated above  HEMATOLOGY: denies bruising or excessive bleeding except as stated  ENDOCRINE: denies unexpected wt gain or wt loss except as stated    ALLERGY/IMM.:medication allergies as above        PHYSICAL EXAM   /65   Pulse 85   Temp 98.6 °F (37 °C) (Temporal)   Resp 16   Ht 5' 10\" (1.778 m)   Wt 163 lb (73.9 kg)   SpO2 99%   BMI 23.39 kg/m²     GENERAL: well developed, well nourished, in no apparent distress  HEENT: normocephalic, mucous membranes moist.  EYES: eomi    NECK:non tender, supple  RESPIRATORY: clear to percussion and auscultation  CARDIOVASCULAR: reg rate    ABDOMEN: normal, active bowel sounds, no masses, HSM or tenderness, soft, nondistended, no G/R/R   RECTAL: in presence of RN (Radha). flecks of brown stool, no solid stool in vault  EXTREMITIES: no bruising or le edema appreciated  BACK: no bruising  NEURO:  Oriented x 3  , no asterixis  BACK: no CVA tenderness  PSYCH: appropriate for the exam           LAB/IMAGING RESULTS:     Lab Results   Component Value Date    PTT 24.6 01/07/2025    INR 1.08 01/07/2025     Recent Labs   Lab 01/07/25  1046   *   BUN 10   CREATSERUM  0.85   CA 9.5      K 3.1*      CO2 27.0       Recent Labs   Lab 01/07/25  1046   RBC 2.80*   HGB 7.4*   HCT 23.7*   MCV 84.6   MCH 26.4   MCHC 31.2   RDW 15.3   NEPRELIM 4.12   WBC 5.8   .0       Recent Labs   Lab 01/07/25  1046   ALKPHO 60   BILT 0.4   TP 6.5   ALB 3.9   ALT 10   AST 23       No results for input(s): \"SHANELL\", \"LIP\" in the last 168 hours.        Narrative   PROCEDURE:  CT ABDOMEN+PELVIS (CONTRAST ONLY) (CPT=74177)     COMPARISON:  None.     INDICATIONS:  headache, dizziness and increased weakness. pt reports she did fall 1 week ago and has pain to where she hit her head.     TECHNIQUE:  CT scanning was performed from the dome of the diaphragm to the pubic symphysis with non-ionic intravenous contrast material. Post contrast coronal MPR imaging was performed.  Dose reduction techniques were used. Dose information is  transmitted to the ACR (American College of Radiology) NRDR (National Radiology Data Registry) which includes the Dose Index Registry.     PATIENT STATED HISTORY:(As transcribed by Technologist)  Patient complains of headache, dizziness and increased weakness. Patient reports she did fall 1 week ago and has pain to where she hit her head.         CONTRAST USED:  80cc of Isovue 370     FINDINGS:    LIVER:  No enlargement, atrophy, abnormal density, or significant focal lesion.    BILIARY:  No visible dilatation or calcification.    PANCREAS:  No lesion, fluid collection, ductal dilatation, or atrophy.    SPLEEN:  No enlargement or focal lesion.    KIDNEYS:  No mass, obstruction, or calcification.    ADRENALS:  No mass or enlargement.    AORTA/VASCULAR:  No aneurysm or dissection.    RETROPERITONEUM:  No mass or adenopathy.    BOWEL/MESENTERY:  Large hiatal hernia containing the majority of the stomach and a portion of the pancreas.  No evidence of gastric volvulus.  No bowel inflammation or obstruction.  Normal appendix.  Descending/sigmoid colonic  diverticulosis.  ABDOMINAL WALL:  No mass or hernia.    URINARY BLADDER:  No visible focal wall thickening, lesion, or calculus.    PELVIC NODES:  No adenopathy.    PELVIC ORGANS:  Absent or atrophic  BONES:  Mild wedge compression fractures of the T12 and L1 vertebral bodies without paravertebral stranding, probably chronic, though exact chronicity indeterminate.  LUNG BASES:  No visible pulmonary or pleural disease.    OTHER:  Negative.                     Impression   CONCLUSION:       1. No acute process identified within the abdomen or pelvis.     2. Mild wedge compression deformities of the T12 and L1 vertebral bodies without paravertebral stranding, probably chronic, though exact chronicity indeterminate.          LOCATION:  [id number]        Dictated by (CST): Shakira Ulloa MD on 1/07/2025 at 12:10 PM                ASSESSMENT AND PLAN:   1 iron deficiency anemia  2 hiatal and paraa-esophageal hernia    Denies overt bleeding.  Seems to be a slow or non acute process as no recent bm today but difficult to fully assess given hx of etoh.      States chronic anemia for \"years\" and self stopper her iron several weeks ago but hg dropped since 12/2024 more rapidly. Denies recent cscope and has had gastric ulcers on 2020 egd and still heavy etoh.  No clear cirrhosis by labs/imaging, portal gastropathy possible.  Occult malignancy, polyps, virginia's erosions, non gi bleeding etc possible    Ct w/ most of stomach and pancreas above diaphragm, reviewed w/ her options of surgical correction for treatment    Multiple options for how to proceed were discussed in detail with pt/family, they are agreeable to the following plan ...  --egd/colon  --ppi q12 for now  --advised no nsaids  --advised to stop etoh  --serial hg      3 alcoholism --  Risk reviewed w her.  No asterixis currently  --defer to hospitalist. But will follow hg. If she is not going to take po fe, could consider heme c/s for iron therapy if needed  --check  ammonia          pt demonstrated understanding of risks of morbidity/mortality if diagnoses and/or treatments were delayed balanced against risks of further investigation and/or treatment.     --the pt demonstrated understanding of the results and recommendations and options and the risk/benefit/limitations and alternatives to the plan.  All of their questions and concerns were addressed and were agreeable to the plan as listed      García Holt MD  1/7/2025          [1] No Known Allergies  [2]   No current facility-administered medications on file prior to encounter.     Current Outpatient Medications on File Prior to Encounter   Medication Sig Dispense Refill    HYDROcodone-acetaminophen 5-325 MG Oral Tab Take 1 tablet by mouth every 6 (six) hours as needed for Pain.      folic acid 1 MG Oral Tab Take 1 tablet (1 mg total) by mouth daily. 30 tablet 0    thiamine 100 MG Oral Tab Take 1 tablet (100 mg total) by mouth daily. 30 tablet 0    metoclopramide 10 MG Oral Tab Take 1 tablet (10 mg total) by mouth 3 (three) times daily as needed. 15 tablet 0    ondansetron 4 MG Oral Tablet Dispersible Take 1 tablet (4 mg total) by mouth every 4 (four) hours as needed for Nausea. 15 tablet 0    ALPRAZolam 0.5 MG Oral Tab Take 1 tablet (0.5 mg total) by mouth 3 (three) times daily.      escitalopram 20 MG Oral Tab Take 1 tablet (20 mg total) by mouth daily. 30 tablet 0    Pantoprazole Sodium 40 MG Oral Tab EC Take 1 tablet (40 mg total) by mouth every morning before breakfast. 30 tablet 5

## 2025-01-07 NOTE — ED QUICK NOTES
Orders for admission, patient is aware of plan and ready to go upstairs. Any questions, please call ED AXEL Magaña at extension [phone].     Patient Covid vaccination status: Unvaccinated     COVID Test Ordered in ED: None    COVID Suspicion at Admission: N/A    Running Infusions:  None    Mental Status/LOC at time of transport: AOx3    Other pertinent information:   CIWA score: N/A   NIH score:  0

## 2025-01-08 ENCOUNTER — ANESTHESIA EVENT (OUTPATIENT)
Dept: ENDOSCOPY | Facility: HOSPITAL | Age: 80
End: 2025-01-08
Payer: MEDICARE

## 2025-01-08 ENCOUNTER — ANESTHESIA (OUTPATIENT)
Dept: ENDOSCOPY | Facility: HOSPITAL | Age: 80
End: 2025-01-08
Payer: MEDICARE

## 2025-01-08 LAB
ADENOVIRUS PCR:: NOT DETECTED
ANION GAP SERPL CALC-SCNC: 9 MMOL/L (ref 0–18)
B PARAPERT DNA SPEC QL NAA+PROBE: NOT DETECTED
B PERT DNA SPEC QL NAA+PROBE: NOT DETECTED
BASOPHILS # BLD AUTO: 0.01 X10(3) UL (ref 0–0.2)
BASOPHILS NFR BLD AUTO: 0.1 %
BUN BLD-MCNC: 5 MG/DL (ref 9–23)
C PNEUM DNA SPEC QL NAA+PROBE: NOT DETECTED
CALCIUM BLD-MCNC: 8.8 MG/DL (ref 8.7–10.4)
CHLORIDE SERPL-SCNC: 108 MMOL/L (ref 98–112)
CO2 SERPL-SCNC: 26 MMOL/L (ref 21–32)
CORONAVIRUS 229E PCR:: NOT DETECTED
CORONAVIRUS HKU1 PCR:: NOT DETECTED
CORONAVIRUS NL63 PCR:: NOT DETECTED
CORONAVIRUS OC43 PCR:: NOT DETECTED
CREAT BLD-MCNC: 0.74 MG/DL
EGFRCR SERPLBLD CKD-EPI 2021: 83 ML/MIN/1.73M2 (ref 60–?)
EOSINOPHIL # BLD AUTO: 0 X10(3) UL (ref 0–0.7)
EOSINOPHIL NFR BLD AUTO: 0 %
ERYTHROCYTE [DISTWIDTH] IN BLOOD BY AUTOMATED COUNT: 15.2 %
FLUAV H1 2009 PAND RNA SPEC QL NAA+PROBE: DETECTED
FLUBV RNA SPEC QL NAA+PROBE: NOT DETECTED
GLUCOSE BLD-MCNC: 111 MG/DL (ref 70–99)
HCT VFR BLD AUTO: 26.8 %
HGB BLD-MCNC: 7.8 G/DL
HGB BLD-MCNC: 8.6 G/DL
HGB BLD-MCNC: 9.8 G/DL
IMM GRANULOCYTES # BLD AUTO: 0.02 X10(3) UL (ref 0–1)
IMM GRANULOCYTES NFR BLD: 0.3 %
LYMPHOCYTES # BLD AUTO: 0.42 X10(3) UL (ref 1–4)
LYMPHOCYTES NFR BLD AUTO: 6.1 %
MCH RBC QN AUTO: 26.2 PG (ref 26–34)
MCHC RBC AUTO-ENTMCNC: 32.1 G/DL (ref 31–37)
MCV RBC AUTO: 81.7 FL
METAPNEUMOVIRUS PCR:: NOT DETECTED
MONOCYTES # BLD AUTO: 0.55 X10(3) UL (ref 0.1–1)
MONOCYTES NFR BLD AUTO: 7.9 %
MYCOPLASMA PNEUMONIA PCR:: NOT DETECTED
NEUTROPHILS # BLD AUTO: 5.94 X10 (3) UL (ref 1.5–7.7)
NEUTROPHILS # BLD AUTO: 5.94 X10(3) UL (ref 1.5–7.7)
NEUTROPHILS NFR BLD AUTO: 85.6 %
OSMOLALITY SERPL CALC.SUM OF ELEC: 294 MOSM/KG (ref 275–295)
PARAINFLUENZA 1 PCR:: NOT DETECTED
PARAINFLUENZA 2 PCR:: NOT DETECTED
PARAINFLUENZA 3 PCR:: NOT DETECTED
PARAINFLUENZA 4 PCR:: NOT DETECTED
PLATELET # BLD AUTO: 254 10(3)UL (ref 150–450)
POTASSIUM SERPL-SCNC: 3.2 MMOL/L (ref 3.5–5.1)
PROCALCITONIN SERPL-MCNC: 0.04 NG/ML (ref ?–0.05)
RBC # BLD AUTO: 3.28 X10(6)UL
RHINOVIRUS/ENTERO PCR:: NOT DETECTED
RSV RNA SPEC QL NAA+PROBE: NOT DETECTED
SARS-COV-2 RNA NPH QL NAA+NON-PROBE: NOT DETECTED
SODIUM SERPL-SCNC: 143 MMOL/L (ref 136–145)
WBC # BLD AUTO: 6.9 X10(3) UL (ref 4–11)

## 2025-01-08 PROCEDURE — 0DBL8ZX EXCISION OF TRANSVERSE COLON, VIA NATURAL OR ARTIFICIAL OPENING ENDOSCOPIC, DIAGNOSTIC: ICD-10-PCS | Performed by: INTERNAL MEDICINE

## 2025-01-08 PROCEDURE — 0DB68ZX EXCISION OF STOMACH, VIA NATURAL OR ARTIFICIAL OPENING ENDOSCOPIC, DIAGNOSTIC: ICD-10-PCS | Performed by: INTERNAL MEDICINE

## 2025-01-08 PROCEDURE — 0DB98ZX EXCISION OF DUODENUM, VIA NATURAL OR ARTIFICIAL OPENING ENDOSCOPIC, DIAGNOSTIC: ICD-10-PCS | Performed by: INTERNAL MEDICINE

## 2025-01-08 PROCEDURE — 99232 SBSQ HOSP IP/OBS MODERATE 35: CPT | Performed by: HOSPITALIST

## 2025-01-08 PROCEDURE — 0DB78ZX EXCISION OF STOMACH, PYLORUS, VIA NATURAL OR ARTIFICIAL OPENING ENDOSCOPIC, DIAGNOSTIC: ICD-10-PCS | Performed by: INTERNAL MEDICINE

## 2025-01-08 PROCEDURE — 0DBK8ZX EXCISION OF ASCENDING COLON, VIA NATURAL OR ARTIFICIAL OPENING ENDOSCOPIC, DIAGNOSTIC: ICD-10-PCS | Performed by: INTERNAL MEDICINE

## 2025-01-08 DEVICE — REPLAY HEMOSTASIS CLIP, 11MM SPAN
Type: IMPLANTABLE DEVICE | Site: COLON
Brand: REPLAY

## 2025-01-08 RX ORDER — PHENYLEPHRINE HCL 10 MG/ML
VIAL (ML) INJECTION AS NEEDED
Status: DISCONTINUED | OUTPATIENT
Start: 2025-01-08 | End: 2025-01-08 | Stop reason: SURG

## 2025-01-08 RX ORDER — BUTALBITAL, ACETAMINOPHEN AND CAFFEINE 50; 325; 40 MG/1; MG/1; MG/1
1 TABLET ORAL EVERY 4 HOURS PRN
Status: DISCONTINUED | OUTPATIENT
Start: 2025-01-08 | End: 2025-01-17

## 2025-01-08 RX ORDER — POTASSIUM CHLORIDE 1500 MG/1
40 TABLET, EXTENDED RELEASE ORAL ONCE
Status: COMPLETED | OUTPATIENT
Start: 2025-01-08 | End: 2025-01-08

## 2025-01-08 RX ORDER — NALOXONE HYDROCHLORIDE 0.4 MG/ML
0.08 INJECTION, SOLUTION INTRAMUSCULAR; INTRAVENOUS; SUBCUTANEOUS ONCE AS NEEDED
Status: DISCONTINUED | OUTPATIENT
Start: 2025-01-08 | End: 2025-01-08 | Stop reason: HOSPADM

## 2025-01-08 RX ORDER — SODIUM CHLORIDE, SODIUM LACTATE, POTASSIUM CHLORIDE, CALCIUM CHLORIDE 600; 310; 30; 20 MG/100ML; MG/100ML; MG/100ML; MG/100ML
INJECTION, SOLUTION INTRAVENOUS CONTINUOUS
Status: DISCONTINUED | OUTPATIENT
Start: 2025-01-08 | End: 2025-01-09

## 2025-01-08 RX ORDER — SODIUM CHLORIDE 9 MG/ML
INJECTION, SOLUTION INTRAVENOUS CONTINUOUS
Status: DISCONTINUED | OUTPATIENT
Start: 2025-01-08 | End: 2025-01-09

## 2025-01-08 RX ORDER — ONDANSETRON 2 MG/ML
4 INJECTION INTRAMUSCULAR; INTRAVENOUS ONCE AS NEEDED
Status: DISCONTINUED | OUTPATIENT
Start: 2025-01-08 | End: 2025-01-08 | Stop reason: HOSPADM

## 2025-01-08 RX ORDER — SODIUM CHLORIDE, SODIUM LACTATE, POTASSIUM CHLORIDE, CALCIUM CHLORIDE 600; 310; 30; 20 MG/100ML; MG/100ML; MG/100ML; MG/100ML
INJECTION, SOLUTION INTRAVENOUS CONTINUOUS PRN
Status: DISCONTINUED | OUTPATIENT
Start: 2025-01-08 | End: 2025-01-08 | Stop reason: SURG

## 2025-01-08 RX ADMIN — PHENYLEPHRINE HCL 100 MCG: 10 MG/ML VIAL (ML) INJECTION at 11:35:00

## 2025-01-08 RX ADMIN — SODIUM CHLORIDE, SODIUM LACTATE, POTASSIUM CHLORIDE, CALCIUM CHLORIDE: 600; 310; 30; 20 INJECTION, SOLUTION INTRAVENOUS at 11:08:00

## 2025-01-08 RX ADMIN — PHENYLEPHRINE HCL 100 MCG: 10 MG/ML VIAL (ML) INJECTION at 11:21:00

## 2025-01-08 RX ADMIN — PHENYLEPHRINE HCL 100 MCG: 10 MG/ML VIAL (ML) INJECTION at 11:30:00

## 2025-01-08 NOTE — ANESTHESIA PREPROCEDURE EVALUATION
PRE-OP EVALUATION    Patient Name: Georgia Thompson    Admit Diagnosis: Blunt head trauma, initial encounter [S09.8XXA]  Gastrointestinal hemorrhage, unspecified gastrointestinal hemorrhage type [K92.2]  Anemia, unspecified type [D64.9]    Pre-op Diagnosis: INPATIENT    ESOPHAGOGASTRODUODENOSCOPY (EGD), COLONOSCOPY    Anesthesia Procedure: ESOPHAGOGASTRODUODENOSCOPY (EGD), COLONOSCOPY  COLONOSCOPY    Surgeons and Role:     * García Holt MD - Primary    Pre-op vitals reviewed.  Temp: 98.4 °F (36.9 °C)  Pulse: 124  Resp: 20  BP: 147/73  SpO2: 94 %  Body mass index is 23.39 kg/m².    Current medications reviewed.  Hospital Medications:   sodium ferric gluconate (Ferrlecit) 125 mg in sodium chloride 0.9% 100mL IVPB premix  125 mg Intravenous Daily    [COMPLETED] ondansetron (Zofran) 4 MG/2ML injection 4 mg  4 mg Intravenous Once    [COMPLETED] sodium chloride 0.9% infusion   Intravenous Once    [COMPLETED] pantoprazole (Protonix) 40 mg in sodium chloride 0.9% PF 10 mL IV push  40 mg Intravenous Once    [COMPLETED] potassium chloride (Klor-Con M20) tab 20 mEq  20 mEq Oral Once    [COMPLETED] iopamidol 76% (ISOVUE-370) injection for power injector  80 mL Intravenous ONCE PRN    pantoprazole (Protonix) 40 mg in sodium chloride 0.9% PF 10 mL IV push  40 mg Intravenous Q12H    [COMPLETED] polyethylene glycol-electrolyte (Golytely) 236 g oral solution 4,000 mL  4,000 mL Oral Once    [COMPLETED] sodium chloride 0.9% infusion   Intravenous Once    sodium chloride 0.9% infusion   Intravenous Continuous    ALPRAZolam (Xanax) tab 0.5 mg  0.5 mg Oral TID PRN    escitalopram (Lexapro) tab 20 mg  20 mg Oral QAM    folic acid (Folvite) tab 1 mg  1 mg Oral Daily    thiamine (Vitamin B1) tab 100 mg  100 mg Oral Daily    multivitamin (Tab-A-Amari/Beta Carotene) tab 1 tablet  1 tablet Oral Daily    acetaminophen (Tylenol Extra Strength) tab 500 mg  500 mg Oral Q4H PRN    melatonin tab 3 mg  3 mg Oral Nightly PRN    ondansetron (Zofran)  4 MG/2ML injection 4 mg  4 mg Intravenous Q6H PRN    metoclopramide (Reglan) 5 mg/mL injection 5 mg  5 mg Intravenous Q8H PRN    polyethylene glycol (PEG 3350) (Miralax) 17 g oral packet 17 g  17 g Oral Daily PRN    sennosides (Senokot) tab 17.2 mg  17.2 mg Oral Nightly PRN    bisacodyl (Dulcolax) 10 MG rectal suppository 10 mg  10 mg Rectal Daily PRN    fleet enema (Fleet) rectal enema 133 mL  1 enema Rectal Once PRN    benzonatate (Tessalon) cap 200 mg  200 mg Oral TID PRN    guaiFENesin (Robitussin) 100 MG/5 ML oral liquid 200 mg  200 mg Oral Q4H PRN    glycerin-hypromellose- (Artificial Tears) 0.2-0.2-1 % ophthalmic solution 1 drop  1 drop Both Eyes QID PRN    sodium chloride (Saline Mist) 0.65 % nasal solution 1 spray  1 spray Each Nare Q3H PRN    butalbital-acetaminophen-caffeine (Fioricet) -40 MG per tab 1 tablet  1 tablet Oral Q4H PRN       Outpatient Medications:   Prescriptions Prior to Admission[1]    Allergies: Patient has no known allergies.      Anesthesia Evaluation        Anesthetic Complications           GI/Hepatic/Renal  Comment: Hematemesis     Hiatal hernia     (+) GERD                           Cardiovascular        Exercise tolerance: good     MET: >4             (-) past MI                (-) angina              Endo/Other      (-) diabetes     (-) hypothyroidism  (-) hyperthyroidism              (+) arthritis       Pulmonary      (-) asthma  (-) COPD                   Neuro/Psych      (+) depression                        Alcohol use disorder         Past Surgical History:   Procedure Laterality Date    Breast surgery            Hip surgery Left      Social History     Socioeconomic History    Marital status:    Tobacco Use    Smoking status: Former    Smokeless tobacco: Never   Vaping Use    Vaping status: Never Used   Substance and Sexual Activity    Alcohol use: Yes     Alcohol/week: 3.0 standard drinks of alcohol     Types: 3 Standard drinks or equivalent  per week     Comment: DAILY; + 30 YEARS , states last etoh was 1/1/25    Drug use: Never     History   Drug Use Unknown     Available pre-op labs reviewed.  Lab Results   Component Value Date    WBC 6.9 01/08/2025    RBC 3.28 (L) 01/08/2025    HGB 8.6 (L) 01/08/2025    HCT 26.8 (L) 01/08/2025    MCV 81.7 01/08/2025    MCH 26.2 01/08/2025    MCHC 32.1 01/08/2025    RDW 15.2 01/08/2025    .0 01/08/2025     Lab Results   Component Value Date     01/08/2025    K 3.2 (L) 01/08/2025     01/08/2025    CO2 26.0 01/08/2025    BUN 5 (L) 01/08/2025    CREATSERUM 0.74 01/08/2025     (H) 01/08/2025    CA 8.8 01/08/2025     Lab Results   Component Value Date    INR 1.08 01/07/2025         Airway      Mallampati: II  Mouth opening: >3 FB  TM distance: > 6 cm  Neck ROM: full Cardiovascular    Cardiovascular exam normal.         Dental    Dentition appears grossly intact         Pulmonary    Pulmonary exam normal.                 Other findings              ASA: 3   Plan: MAC  NPO status verified and patient meets guidelines.        Comment: Plan is MAC anesthesia, which likely will include deep sedation.  Implied that memory of procedure is unlikely although intraop recall, if it occurs, may be a reasonable and comfortable experience with this anesthetic.  Aware that general anesthesia is not intended though deep sedation may include brief moments of general anesthesia.      Plan/risks discussed with: patient                Present on Admission:  **None**             [1]   Medications Prior to Admission   Medication Sig Dispense Refill Last Dose/Taking    HYDROcodone-acetaminophen 5-325 MG Oral Tab Take 1 tablet by mouth every 6 (six) hours as needed for Pain.   1/6/2025 Morning    ALPRAZolam 1 MG Oral Tab Take 1 tablet (1 mg total) by mouth 3 (three) times daily.   1/7/2025    folic acid 1 MG Oral Tab Take 1 tablet (1 mg total) by mouth daily. 30 tablet 0 1/6/2025    thiamine 100 MG Oral Tab Take 1 tablet  (100 mg total) by mouth daily. 30 tablet 0 Past Week    ondansetron 4 MG Oral Tablet Dispersible Take 1 tablet (4 mg total) by mouth every 4 (four) hours as needed for Nausea. 15 tablet 0 Past Week    escitalopram 20 MG Oral Tab Take 1 tablet (20 mg total) by mouth daily. 30 tablet 0 1/6/2025    Pantoprazole Sodium 40 MG Oral Tab EC Take 1 tablet (40 mg total) by mouth every morning before breakfast. 30 tablet 5 1/6/2025 Morning    metoclopramide 10 MG Oral Tab Take 1 tablet (10 mg total) by mouth 3 (three) times daily as needed. 15 tablet 0

## 2025-01-08 NOTE — PHYSICAL THERAPY NOTE
Reviewed pt's chart and discuss pt with RN. Met with the pt, and discuss pt with the RN. At this time pt refused PT eval due to not feeling well and requested that we re-attempt after her colonoscopy. PT will follow up when available.

## 2025-01-08 NOTE — CONSULTS
[facility]  Report of Consultation    Georgia Thompson Patient Status:  Inpatient    1945 MRN [id number]   Location [facility] Attending Burak Conn MD   Hosp Day # 1 PCP FREDRICK TONY MD     2025    Reason for Consultation:    Surgical Consultation     CC:   Chief Complaint   Patient presents with    Headache    Dizziness        History of Present Illness:    Georgia Thompson is a a(n) 79 year old female. Patient with longstanding hiatal hernia was admitted to the hospital after c/o headaches after she fell last week and hit her head. She also noted she had black stools recently.   Patient was evaluated by GI with an EGD/colonoscopy today where it was noted a large hiatal hernia.  CT imaging on admission notes large hiatal hernia contain most of stomach and portion of pancreas. No volvulus noted.   Patient reports she had seen a surgeon years ago though since she was not symptomatic she declined intervention. She denies any dysphagia, no reflux, does not nausea   Patient does hve hx of alcohol abuse, reports she stopped drinking New years day.   Previous surgery includes c section  Denies any use of anticoagulation       Past Medical History:    Past Medical History:    Alcohol abuse    Anxiety    Depression       Past Surgical History:    Past Surgical History:   Procedure Laterality Date    Breast surgery            Hip surgery Left        Family History:    History reviewed. No pertinent family history.    Social History:     reports that she has quit smoking. She has never used smokeless tobacco. She reports current alcohol use of about 3.0 standard drinks of alcohol per week. She reports that she does not use drugs.    Allergies:    Allergies[1]    Current Medications:      Current Facility-Administered Medications:     sodium ferric gluconate (Ferrlecit) 125 mg in sodium chloride 0.9% 100mL IVPB premix, 125 mg, Intravenous, Daily    lactated ringers infusion,  , Intravenous, Continuous    naloxone (Narcan) 0.4 MG/ML injection 0.08 mg, 0.08 mg, Intravenous, Once PRN    ondansetron (Zofran) 4 MG/2ML injection 4 mg, 4 mg, Intravenous, Once PRN    butalbital-acetaminophen-caffeine (Fioricet) -40 MG per tab 1 tablet, 1 tablet, Oral, Q4H PRN    pantoprazole (Protonix) 40 mg in sodium chloride 0.9% PF 10 mL IV push, 40 mg, Intravenous, Q12H    sodium chloride 0.9% infusion, , Intravenous, Continuous    ALPRAZolam (Xanax) tab 0.5 mg, 0.5 mg, Oral, TID PRN    escitalopram (Lexapro) tab 20 mg, 20 mg, Oral, QAM    folic acid (Folvite) tab 1 mg, 1 mg, Oral, Daily    thiamine (Vitamin B1) tab 100 mg, 100 mg, Oral, Daily    multivitamin (Tab-A-Amari/Beta Carotene) tab 1 tablet, 1 tablet, Oral, Daily    acetaminophen (Tylenol Extra Strength) tab 500 mg, 500 mg, Oral, Q4H PRN    melatonin tab 3 mg, 3 mg, Oral, Nightly PRN    ondansetron (Zofran) 4 MG/2ML injection 4 mg, 4 mg, Intravenous, Q6H PRN    metoclopramide (Reglan) 5 mg/mL injection 5 mg, 5 mg, Intravenous, Q8H PRN    polyethylene glycol (PEG 3350) (Miralax) 17 g oral packet 17 g, 17 g, Oral, Daily PRN    sennosides (Senokot) tab 17.2 mg, 17.2 mg, Oral, Nightly PRN    bisacodyl (Dulcolax) 10 MG rectal suppository 10 mg, 10 mg, Rectal, Daily PRN    fleet enema (Fleet) rectal enema 133 mL, 1 enema, Rectal, Once PRN    benzonatate (Tessalon) cap 200 mg, 200 mg, Oral, TID PRN    guaiFENesin (Robitussin) 100 MG/5 ML oral liquid 200 mg, 200 mg, Oral, Q4H PRN    glycerin-hypromellose- (Artificial Tears) 0.2-0.2-1 % ophthalmic solution 1 drop, 1 drop, Both Eyes, QID PRN    sodium chloride (Saline Mist) 0.65 % nasal solution 1 spray, 1 spray, Each Nare, Q3H PRN    Facility-Administered Medications Ordered in Other Encounters:     propofol (Diprivan) 10 MG/ML injection, , Intravenous, PRN    propofol (Diprivan) 10 mg/mL infusion premix, , Intravenous, Continuous PRN    lactated ringers infusion, , Intravenous, Continuous PRN     phenylephrine (Luis-Synephrine) 10 MG/ML injection, , Intravenous, PRN    Home Medications:    Medications Ordered Prior to Encounter[2]    Review of Systems:    10 point review performed pertinent positives and negatives per history of present illness.    Physical Exam:    Blood pressure 139/67, pulse (!) 124, temperature 98.4 °F (36.9 °C), temperature source Temporal, resp. rate 14, height 5' 10\" (1.778 m), weight 163 lb (73.9 kg), SpO2 91%.    GENERAL: Alert and oriented x 3, well developed, well nourished female, in no apparent distress    SKIN: anicteric    RESPIRATORY: non labored breathing    CARDIOVASCULAR: RRR    ABDOMEN: soft, non tender, non distended     LYMPHATIC: no lymphadenopathy    EXTREMITIES: no cyanosis, clubbing or edema    .    Laboratory Data:  Recent Labs   Lab 01/07/25  1046 01/07/25  1630 01/07/25  2342 01/08/25  0748   WBC 5.8  --   --  6.9   HGB 7.4* 6.8* 9.8* 8.6*   MCV 84.6  --   --  81.7   .0  --   --  254.0   INR 1.08  --   --   --        Recent Labs   Lab 01/07/25  1046 01/08/25  0748    143   K 3.1* 3.2*    108   CO2 27.0 26.0   BUN 10 5*   CREATSERUM 0.85 0.74   * 111*   CA 9.5 8.8       Recent Labs   Lab 01/07/25  1046   ALT 10   AST 23   ALB 3.9       No results for input(s): \"TROP\" in the last 168 hours.          Radiology:    CT ABDOMEN+PELVIS(CONTRAST ONLY)(CPT=74177)    Result Date: 1/7/2025  PROCEDURE:  CT ABDOMEN+PELVIS (CONTRAST ONLY) (CPT=74177)  COMPARISON:  None.  INDICATIONS:  headache, dizziness and increased weakness. pt reports she did fall 1 week ago and has pain to where she hit her head.  TECHNIQUE:  CT scanning was performed from the dome of the diaphragm to the pubic symphysis with non-ionic intravenous contrast material. Post contrast coronal MPR imaging was performed.  Dose reduction techniques were used. Dose information is transmitted to the ACR (American College of Radiology) NRDR (National Radiology Data Registry) which includes  the Dose Index Registry.  PATIENT STATED HISTORY:(As transcribed by Technologist)  Patient complains of headache, dizziness and increased weakness. Patient reports she did fall 1 week ago and has pain to where she hit her head.    CONTRAST USED:  80cc of Isovue 370  FINDINGS:  LIVER:  No enlargement, atrophy, abnormal density, or significant focal lesion.  BILIARY:  No visible dilatation or calcification.  PANCREAS:  No lesion, fluid collection, ductal dilatation, or atrophy.  SPLEEN:  No enlargement or focal lesion.  KIDNEYS:  No mass, obstruction, or calcification.  ADRENALS:  No mass or enlargement.  AORTA/VASCULAR:  No aneurysm or dissection.  RETROPERITONEUM:  No mass or adenopathy.  BOWEL/MESENTERY:  Large hiatal hernia containing the majority of the stomach and a portion of the pancreas.  No evidence of gastric volvulus.  No bowel inflammation or obstruction.  Normal appendix.  Descending/sigmoid colonic diverticulosis. ABDOMINAL WALL:  No mass or hernia.  URINARY BLADDER:  No visible focal wall thickening, lesion, or calculus.  PELVIC NODES:  No adenopathy.  PELVIC ORGANS:  Absent or atrophic BONES:  Mild wedge compression fractures of the T12 and L1 vertebral bodies without paravertebral stranding, probably chronic, though exact chronicity indeterminate. LUNG BASES:  No visible pulmonary or pleural disease.  OTHER:  Negative.             CONCLUSION:   1. No acute process identified within the abdomen or pelvis.  2. Mild wedge compression deformities of the T12 and L1 vertebral bodies without paravertebral stranding, probably chronic, though exact chronicity indeterminate.    LOCATION:  [id number]   Dictated by (CST): Shakira Ulloa MD on 1/07/2025 at 12:10 PM     Finalized by (CST): Shakira Ulloa MD on 1/07/2025 at 12:15 PM       CT BRAIN OR HEAD (CPT=70450)    Result Date: 1/7/2025  PROCEDURE:  CT BRAIN OR HEAD (44143)  COMPARISON:  None.  INDICATIONS:  headache, dizziness and increased weakness. pt reports she  did fall 1 week ago and has pain to where she hit her head.  TECHNIQUE:  Noncontrast CT scanning is performed through the brain. Dose reduction techniques were used. Dose information is transmitted to the ACR (American College of Radiology) NRDR (National Radiology Data Registry) which includes the Dose Index Registry.  PATIENT STATED HISTORY: (As transcribed by Technologist)   Patient complains of headache, dizziness and increased weakness. Patient reports she did fall 1 week ago and has pain to where she hit her head.    FINDINGS:   VENTRICLES/SULCI: Diffuse sulcal and ventricular prominence concordant with age.  No hydrocephalus. INTRACRANIAL:  Negative for intracranial hemorrhage, mass effect, or acute large vessel transcortical infarct.  Patchy periventricular white matter hypodensity most in keeping with chronic microvascular ischemia.  Intracranial atherosclerosis. SINUSES:           Paranasal sinuses and mastoid air cells are clear. SKULL:               No evidence for fracture or osseous abnormality. OTHER:               No scalp hematoma.            CONCLUSION:   1. Negative for acute intracranial process.    LOCATION:  [id number]   Dictated by (CST): Shakira Ulloa MD on 1/07/2025 at 12:08 PM     Finalized by (CST): Shakira Ulloa MD on 1/07/2025 at 12:10 PM       XR CHEST AP PORTABLE  (CPT=71045)    Result Date: 1/7/2025  PROCEDURE:  XR CHEST AP PORTABLE  (CPT=71045)  TECHNIQUE:  AP chest radiograph was obtained.  COMPARISON:  [facility] , XR, XR CHEST AP PORTABLE  (CPT=71045), 1/20/2021, 9:02 PM.  INDICATIONS:  headache, dizziness and increased weakness. pt reports she did fall 1 week ago and has pain to where she hit her head.  PATIENT STATED HISTORY: (As transcribed by Technologist)  Patient states headache and dizziness afer falling 1 week ago and hitting her head. She states she fell the first time 6 weeks ago.    FINDINGS:  There is a large hiatal hernia which projects over left lower chest and causes  atelectasis in left lower lobe.  Heart size is within normal limits.  Mediastinum and akbar are unremarkable.  Chest wall structures are unremarkable.            CONCLUSION:  1. Large hiatal hernia with dependent atelectasis left lower lobe.  This is a stable finding. 2. There is no other acute abnormality.   LOCATION:  [facility]      Dictated by (CST): Jaxson Bullock MD on 1/07/2025 at 11:37 AM     Finalized by (CST): Jaxson Bullock MD on 1/07/2025 at 11:38 AM          Problem List:    Patient Active Problem List   Diagnosis    Anxiety    Panic attack    Chronic prescription benzodiazepine use    Anxiety and depression    Severe alcohol use disorder (HCC)    Former smoker    Gastroesophageal reflux disease, esophagitis presence not specified    Lack of appetite    Alcoholic intoxication with complication (HCC)    COVID-19    Suicidal ideation    Hypokalemia    COVID-19 virus infection    Substance induced mood disorder (HCC)    Alcohol withdrawal syndrome with complication (HCC)    Alcohol abuse    Melanotic stools    Gastrointestinal hemorrhage, unspecified gastrointestinal hemorrhage type    Anemia, unspecified type    Blunt head trauma, initial encounter       Impression:    80 y/o with hiatal hernia  -CT scan as noted above  Hx ETOH abuse    Plan:    Reviewed with patient no plans for urgent surgical management though will see patient as outpatient to discuss elective surgical management  All questions answered  Will f/u in office in 1-2 weeks with Dr Justice Lees, PA  [facility]  General Surgery  1/8/2025         [1] No Known Allergies  [2]   No current facility-administered medications on file prior to encounter.     Current Outpatient Medications on File Prior to Encounter   Medication Sig Dispense Refill    HYDROcodone-acetaminophen 5-325 MG Oral Tab Take 1 tablet by mouth every 6 (six) hours as needed for Pain.      ALPRAZolam 1 MG Oral Tab Take 1 tablet (1 mg total) by mouth 3 (three)  times daily.      folic acid 1 MG Oral Tab Take 1 tablet (1 mg total) by mouth daily. 30 tablet 0    thiamine 100 MG Oral Tab Take 1 tablet (100 mg total) by mouth daily. 30 tablet 0    ondansetron 4 MG Oral Tablet Dispersible Take 1 tablet (4 mg total) by mouth every 4 (four) hours as needed for Nausea. 15 tablet 0    escitalopram 20 MG Oral Tab Take 1 tablet (20 mg total) by mouth daily. 30 tablet 0    Pantoprazole Sodium 40 MG Oral Tab EC Take 1 tablet (40 mg total) by mouth every morning before breakfast. 30 tablet 5    metoclopramide 10 MG Oral Tab Take 1 tablet (10 mg total) by mouth 3 (three) times daily as needed. 15 tablet 0

## 2025-01-08 NOTE — PROGRESS NOTES
[facility]   part of formerly [facility]     Hospitalist Progress Note     Georgia Thompson Patient Status:  Inpatient    1945 MRN [id number]   Location [facility] 3NE-A Attending Burak Conn MD   Hosp Day # 1 PCP CHAVO MCHUGH, FREDRICK     Subjective:   Has a cold and HA  No fevers     Objective:    Review of Systems:   A comprehensive review of systems was completed; pertinent positive and negatives stated in subjective.  Vital signs:  Temp:  [97.4 °F (36.3 °C)-99.8 °F (37.7 °C)] 99.8 °F (37.7 °C)  Pulse:  [] 124  Resp:  [14-18] 17  BP: (103-147)/(58-83) 147/71  SpO2:  [92 %-100 %] 93 %  Physical Exam:    General: No acute distress    Respiratory: no wheezes, no rhonchi  Cardiovascular: S1, S2, RRR  Abdomen: Soft, NT/ND, +BS  Extremities: no edema    Diagnostic Data:    Labs:  Recent Labs   Lab 25  1046 25  1630 25  2342   WBC 5.8  --   --    HGB 7.4* 6.8* 9.8*   MCV 84.6  --   --    .0  --   --    INR 1.08  --   --      Recent Labs   Lab 25  1046   *   BUN 10   CREATSERUM 0.85   CA 9.5   ALB 3.9      K 3.1*      CO2 27.0   ALKPHO 60   AST 23   ALT 10   BILT 0.4   TP 6.5     Estimated Creatinine Clearance: 59.9 mL/min (based on SCr of 0.85 mg/dL).  Recent Labs   Lab 25  1046   PTP 13.8   INR 1.08        Microbiology  No results found for this visit on 25.  Imaging: Reviewed in Epic.  Medications:    pantoprazole  40 mg Intravenous Q12H    escitalopram  20 mg Oral QAM    folic acid  1 mg Oral Daily    thiamine  100 mg Oral Daily    multivitamin  1 tablet Oral Daily       Assessment & Plan:      #Acute blood loss anemia due to GI bleed given dark stool  #Large Hiatal hernia  -Monitor hgb  -transfuse PRBCs and good response   -IV PPI BID  -GI consulted  -NPO, EGD/colonoscopy today   -Gentle IVF  -SAIGE- add iron      #Falls, dizziness  -Unclear if related to above  -CT head unremarkable  -PT/OT    #Alcohol abuse disorder  -MVI, folic acid,  thiamine  -Last drink was New Year's  -Monitor for withdrawal and if shows signs will start CIWA protocol     #Anxiety and depression   -Lexapro  -PRN xanax    #Sinus tachycardia- asymptomatic suspect 2/2 above. No chest pain. Trop neg. Tele and monitor      #Hypokalemia  -Replace per protocol    #Headache and likely URI  -resp panel  -fioricet   -CT head negative    #Incidental T12/L1 compression deformity- asymptomatic and suspect chronic - outpt f/u     ADDENDUM:  Patient now with fever and sinus tachy  Check blood cx  Check PCT  Increase IVF  Sx consulted per GI          Supplementary Documentation:   Quality:  DVT Mechanical Prophylaxis:   SCDs,    DVT Pharmacologic Prophylaxis   Medication   None                Code Status: Not on file  Stafford: No urinary catheter in place  Stafford Duration (in days):   Central line:    KB:   At this point Ms. Thompson is expected to be discharge to: home     The 21st Century Cures Act makes medical notes like these available to patients in the interest of transparency. Please be advised this is a medical document. Medical documents are intended to carry relevant information, facts as evident, and the clinical opinion of the practitioner. The medical note is intended as peer to peer communication and may appear blunt or direct. It is written in medical language and may contain abbreviations or verbiage that are unfamiliar.              **Certification      PHYSICIAN Certification of Need for Inpatient Hospitalization - Initial Certification    Patient will require inpatient services that will reasonably be expected to span two midnight's based on the clinical documentation in H+P.   Based on patients current state of illness, I anticipate that, after discharge, patient will require TBD.

## 2025-01-08 NOTE — OPERATIVE REPORT
ENDOSCOPY OPERATIVE REPORT    Patient Name:  Georgia Thompson  Medical Record #: [id number]  YOB: 1945  Date of Procedure: 1/8/2025    Preoperative Diagnosis:  hiatal hernia, iron deficiency anemia    Postoperative Diagnosis:   1) mild schatzki's ring.  Large hiatal hernia with possible para-esophageal component.  Non bleeding virginia's erosions and gastritis    2) nl distal ti, pan-diverticulosis, 3 polyps from 4-7 mm.     Procedure Performed: Esophagogastroduodenoscopy with biopsy                Colonoscopy with snare     Anesthesia Given: MAC    Cecal withdrawal time: 10 minutes        Endoscopist:   García Holt MD        Procedure:   After the patient was interviewed and the procedure again discussed and questions addressed, the patient was brought to the GI Lab and monitoring of the B/P, pulse, and pulse oximetry was performed. The patient was then placed in the left lateral decubitus position and sedated with divided doses of IV medication; continuous vital signs were monitored throughout the procedure.    A time-out procedure was performed, history and physical were reviewed, medical reconciliation was complete, informed consent was obtained.     The videogastroscope was intubated into the esophagus and advanced into the duodenum under directed vision without difficulty. Then withdrawn.    The patient was repositioned and prepared for the colonoscopy.  The scope was inserted through the rectum and advanced to the cecum as identified by the appendiceal orifice and ileocecal valve and into distal ti and then withdrawn. The quality of the preparation was Aronchick grade 2 (good).  A thorough exam was performed throughout the procedures.    The patient tolerated the procedures well.       Findings:    The esophagus mucosa had an overall normal appearance until distal esophagus.  No varices.  Mild distal esophageal ring, non obstructing. The Z-line  occurred at the top of the gastric folds.    The  gastric lumen was then entered and views of the gastric mucosa as well as retroverted views of the fundus were obtained.  Large hiatal hernia present, appeared to be para-esophageal component but difficult to fully determine as a lot of retroflexion needed to see and then intubate antrum.  Within the hiatal hernia there were non bleeding virginia's erosions and some gastritis but no ulcers noted.  Biopsies from the prepyloric area, antrum, body, and fundus were taken for histology and for H. Pylori. Included in the biopsies were areas of virginia's erosions.     A normal pylorus was passed and the duodenal bulb entered, the duodenal bulb and post-bulbar duodenum were unremarkable. Random biopsies were taken from the first and second duodenum to evaluate for celiac disease      Endocuff used    Only distal TI was able to be evaluated    A 4 mm polyp seen in the ascending and removed with the cold snare and recovered.    A 5 mm and 7 mm polyp seen in the transverse and removed with the cold snare and recovered, mild oozing after resection of the 7 mm polyp so 1 clip placed and no further bleeding noted.    Pan-colonic diverticulosis was noted.    The overall visualized mucosal pattern of the colon was unremarkable.    At the anal verge the endoscope was retroverted,   internal hemorrhoids were noted.    No other abnormalities were identified.     The procedure was tolerated well and upon completion and throughtout the vital signs were stable.      Specimens:  See above      Condition on discharge from procedure:  good      PLAN:  Patient is to follow a high fiber, low fat diet and followup with primary physician for routine care.    Anemia may be from the virginia's erosions, other etio's still possible and discussed this pre and post-endoscopy w/ her    CT concerning for large portion of the stomach and portion of the pancreas, see report    She had no new/old blood throughout this exam      --clears for now while hg  monitored to see if stays stable.  We had discussed other eval for anemia but she wants to follow up with surgeon to discuss correction of hiatal hernia  --defer iron therapy to hospitalist but she is at risk for recurrence  --otherwise can attempt clears and if hg stable can f/u outpt from gi perspective         Reviewed findings w/ pt and her friend (Shonda) after procedure       García Holt MD  [facility]

## 2025-01-08 NOTE — OCCUPATIONAL THERAPY NOTE
OT orders received, pt chart reviewed. Pt reports she is going down for a colonoscopy soon, also reports a headache and did not sleep well last night, asking for therapy to return later. OT will continue to follow, will re-attempt as able.

## 2025-01-08 NOTE — PROGRESS NOTES
[facility]  Progress Note    Georgia Thompson Patient Status:  Inpatient    1945 MRN [id number]   Location [facility] 3NE-A Attending Babar Pandey, DO   Hosp Day # 1 PCP CHAVO MCHUGH, FREDRICK     Subjective:  Georgia Thompson is a(n) 79 year old female.         Current complaints: denies new issues    States prep is clear    Risk of hiatal hernia reviewed w/ her    Risk of procedure and options re-reviewed w her    She is agreeable to see surgeon for the hernia    Risks of potential aspiration with morbidity and mortality discussed. Risks of volvulus discussed as well.  Risks/benefits/alternatives of surgical consultation/input discussed. All the patient's questions were answered and the patient demonstrated understanding.    We discussed risks of long term PPI use with potential issues of calcium absorption, bone fractures, other issues of absorption as well as possible vitamin issues or even infections. Potential for renal, cardiac, neurologic disease, or morbidity/mortality risks discussed as well.  Possible complications/side effects were discussed. Several strategies to taper or reduce overall usage were discussed. The patient demonstrated understanding, all questions were answered         Current Facility-Administered Medications   Medication Dose Route Frequency    pantoprazole (Protonix) 40 mg in sodium chloride 0.9% PF 10 mL IV push  40 mg Intravenous Q12H    sodium chloride 0.9% infusion   Intravenous Continuous    ALPRAZolam (Xanax) tab 0.5 mg  0.5 mg Oral TID PRN    escitalopram (Lexapro) tab 20 mg  20 mg Oral QAM    folic acid (Folvite) tab 1 mg  1 mg Oral Daily    thiamine (Vitamin B1) tab 100 mg  100 mg Oral Daily    multivitamin (Tab-A-Amari/Beta Carotene) tab 1 tablet  1 tablet Oral Daily    acetaminophen (Tylenol Extra Strength) tab 500 mg  500 mg Oral Q4H PRN    melatonin tab 3 mg  3 mg Oral Nightly PRN    ondansetron (Zofran) 4 MG/2ML injection 4 mg  4 mg Intravenous Q6H PRN     metoclopramide (Reglan) 5 mg/mL injection 5 mg  5 mg Intravenous Q8H PRN    polyethylene glycol (PEG 3350) (Miralax) 17 g oral packet 17 g  17 g Oral Daily PRN    sennosides (Senokot) tab 17.2 mg  17.2 mg Oral Nightly PRN    bisacodyl (Dulcolax) 10 MG rectal suppository 10 mg  10 mg Rectal Daily PRN    fleet enema (Fleet) rectal enema 133 mL  1 enema Rectal Once PRN    benzonatate (Tessalon) cap 200 mg  200 mg Oral TID PRN    guaiFENesin (Robitussin) 100 MG/5 ML oral liquid 200 mg  200 mg Oral Q4H PRN    glycerin-hypromellose- (Artificial Tears) 0.2-0.2-1 % ophthalmic solution 1 drop  1 drop Both Eyes QID PRN    sodium chloride (Saline Mist) 0.65 % nasal solution 1 spray  1 spray Each Nare Q3H PRN    butalbital-acetaminophen-caffeine (Fioricet) -40 MG per tab 1 tablet  1 tablet Oral Q4H PRN        Objective:    /83 (BP Location: Left arm)   Pulse (!) 128   Temp 99.8 °F (37.7 °C) (Oral)   Resp 18   Ht 5' 10\" (1.778 m)   Wt 163 lb (73.9 kg)   SpO2 94%   BMI 23.39 kg/m²   General appearance: alert, appears stated age, and cooperative  Lungs: clear to auscultation bilaterally  Heart: reg rate    Abdomen: soft, non-tender; bowel sounds normal; no masses,  no organomegaly    Extremities: no le edema  Neurologic: Grossly normal, ox3        Labs:     Recent Labs   Lab 01/07/25  1046 01/07/25  1630 01/07/25  2342   RBC 2.80*  --   --    HGB 7.4*   < > 9.8*   HCT 23.7*  --   --    MCV 84.6  --   --    MCH 26.4  --   --    MCHC 31.2  --   --    RDW 15.3  --   --    NEPRELIM 4.12  --   --    WBC 5.8  --   --    .0  --   --     < > = values in this interval not displayed.       Lab Results   Component Value Date    CREATSERUM 0.85 01/07/2025    BUN 10 01/07/2025     01/07/2025    K 3.1 01/07/2025     01/07/2025    CO2 27.0 01/07/2025     01/07/2025    CA 9.5 01/07/2025       Lab Results   Component Value Date    ALB 3.9 01/07/2025    ALKPHO 60 01/07/2025    BILT 0.4 01/07/2025     TP 6.5 01/07/2025    AST 23 01/07/2025    ALT 10 01/07/2025       Lab Results   Component Value Date    PTT 24.6 01/07/2025    INR 1.08 01/07/2025   )    Lab Results   Component Value Date    PTP 13.8 01/07/2025    ETOH <3 01/07/2025           Assessment and Plan:  Patient Active Problem List   Diagnosis    Anxiety    Panic attack    Chronic prescription benzodiazepine use    Anxiety and depression    Severe alcohol use disorder (HCC)    Former smoker    Gastroesophageal reflux disease, esophagitis presence not specified    Lack of appetite    Alcoholic intoxication with complication (HCC)    COVID-19    Suicidal ideation    Hypokalemia    COVID-19 virus infection    Substance induced mood disorder (HCC)    Alcohol withdrawal syndrome with complication (HCC)    Alcohol abuse    Melanotic stools    Gastrointestinal hemorrhage, unspecified gastrointestinal hemorrhage type    Anemia, unspecified type    Blunt head trauma, initial encounter       1 hiatal hernia  2 iron deficiency anemia    Hg improved, may be equilibrating as dropped again but better on admit  --surg c/s for hiatal hernia  --egd/colon now        pt demonstrated understanding of risks of morbidity/mortality if diagnoses and/or treatments were delayed balanced against risks of further investigation and/or treatment.     --patient demonstrated understanding of the results and recommendations and risks, benefits, limitations, and alternatives to the plan. All of their questions and concerns were addressed and were agreeable to the plan as listed      García Holt MD

## 2025-01-08 NOTE — ANESTHESIA POSTPROCEDURE EVALUATION
[facility]    Georgia Thompson Patient Status:  Inpatient   Age/Gender 79 year old female MRN [id number]   Location [facility] Attending Burak Conn MD   Hosp Day # 1 PCP FREDRICK TONY MD       Anesthesia Post-op Note    ESOPHAGOGASTRODUODENOSCOPY (EGD) with biopsies, COLONOSCOPY wih cold snare polypectomy and clip placement x1    Procedure Summary       Date: 01/08/25 Room / Location:  ENDOSCOPY 02 / [facility] END[facility]    Anesthesia Start: 1108 Anesthesia Stop: 1202    Procedures:       ESOPHAGOGASTRODUODENOSCOPY (EGD) with biopsies, COLONOSCOPY wih cold snare polypectomy and clip placement x1      COLONOSCOPY Diagnosis: (schatzki ring, divericulosis, colon polyp)    Surgeons: García Holt MD Anesthesiologist: Brandon Tracy MD    Anesthesia Type: MAC ASA Status: 3            Anesthesia Type: MAC    Vitals Value Taken Time   /70 01/08/25 1203        Pulse 121 01/08/25 1203   Resp 21 01/08/25 1203   SpO2 96 % 01/08/25 1203   Vitals shown include unfiled device data.        Patient Location: Endoscopy    Anesthesia Type: MAC    Airway Patency: patent    Postop Pain Control: adequate    Mental Status: mildly sedated but able to meaningfully participate in the post-anesthesia evaluation    Nausea/Vomiting: none    Cardiopulmonary/Hydration status: stable euvolemic    Complications: no apparent anesthesia related complications    Postop vital signs: stable    Dental Exam: Unchanged from Preop    Patient to be discharged from PACU when criteria met.

## 2025-01-08 NOTE — PLAN OF CARE
Assumed care at 1930  A&Ox4  RA  Tele- NSR  CLD, NPO @ midnight for EGD/ Colonoscopy in AM  Prep started this evening and completed  Up 1 w/ walker, stand & pivot to bedside commode  Denies pain/n/v  PIV R AC infusing 0.9NS @ 83ml/hr  Received 1 unit PRBC this evening, repeat Hgb 9.8  Call light within reach, safety precautions maintained  POC ongoing    Problem: METABOLIC/FLUID AND ELECTROLYTES - ADULT  Goal: Electrolytes maintained within normal limits  Description: INTERVENTIONS:  - Monitor labs and rhythm and assess patient for signs and symptoms of electrolyte imbalances  - Administer electrolyte replacement as ordered  - Monitor response to electrolyte replacements, including rhythm and repeat lab results as appropriate  - Fluid restriction as ordered  - Instruct patient on fluid and nutrition restrictions as appropriate  Outcome: Progressing     Problem: HEMATOLOGIC - ADULT  Goal: Maintains hematologic stability  Description: INTERVENTIONS  - Assess for signs and symptoms of bleeding or hemorrhage  - Monitor labs and vital signs for trends  - Administer supportive blood products/factors, fluids and medications as ordered and appropriate  - Administer supportive blood products/factors as ordered and appropriate  Outcome: Progressing  Goal: Free from bleeding injury  Description: (Example usage: patient with low platelets)  INTERVENTIONS:  - Avoid intramuscular injections, enemas and rectal medication administration  - Ensure safe mobilization of patient  - Hold pressure on venipuncture sites to achieve adequate hemostasis  - Assess for signs and symptoms of internal bleeding  - Monitor lab trends  - Patient is to report abnormal signs of bleeding to staff  - Avoid use of toothpicks and dental floss  - Use electric shaver for shaving  - Use soft bristle tooth brush  - Limit straining and forceful nose blowing  Outcome: Progressing     Problem: PAIN - ADULT  Goal: Verbalizes/displays adequate comfort level or  patient's stated pain goal  Description: INTERVENTIONS:  - Encourage pt to monitor pain and request assistance  - Assess pain using appropriate pain scale  - Administer analgesics based on type and severity of pain and evaluate response  - Implement non-pharmacological measures as appropriate and evaluate response  - Consider cultural and social influences on pain and pain management  - Manage/alleviate anxiety  - Utilize distraction and/or relaxation techniques  - Monitor for opioid side effects  - Notify MD/LIP if interventions unsuccessful or patient reports new pain  - Anticipate increased pain with activity and pre-medicate as appropriate  Outcome: Progressing     Problem: SAFETY ADULT - FALL  Goal: Free from fall injury  Description: INTERVENTIONS:  - Assess pt frequently for physical needs  - Identify cognitive and physical deficits and behaviors that affect risk of falls.  - [place] fall precautions as indicated by assessment.  - Educate pt/family on patient safety including physical limitations  - Instruct pt to call for assistance with activity based on assessment  - Modify environment to reduce risk of injury  - Provide assistive devices as appropriate  - Consider OT/PT consult to assist with strengthening/mobility  - Encourage toileting schedule  Outcome: Progressing     Problem: DISCHARGE PLANNING  Goal: Discharge to home or other facility with appropriate resources  Description: INTERVENTIONS:  - Identify barriers to discharge w/pt and caregiver  - Include patient/family/discharge partner in discharge planning  - Arrange for needed discharge resources and transportation as appropriate  - Identify discharge learning needs (meds, wound care, etc)  - Arrange for interpreters to assist at discharge as needed  - Consider post-discharge preferences of patient/family/discharge partner  - Complete POLST form as appropriate  - Assess patient's ability to be responsible for managing their own health  - Refer  to Case Management Department for coordinating discharge planning if the patient needs post-hospital services based on physician/LIP order or complex needs related to functional status, cognitive ability or social support system  Outcome: Progressing

## 2025-01-09 PROBLEM — J11.1 INFLUENZA: Status: ACTIVE | Noted: 2025-01-09

## 2025-01-09 LAB
ATRIAL RATE: 90 BPM
BLOOD TYPE BARCODE: 6200
HGB BLD-MCNC: 7.6 G/DL
HGB BLD-MCNC: 7.7 G/DL
HGB BLD-MCNC: 8.1 G/DL
P AXIS: -2 DEGREES
P-R INTERVAL: 198 MS
POTASSIUM SERPL-SCNC: 3 MMOL/L (ref 3.5–5.1)
POTASSIUM SERPL-SCNC: 3.6 MMOL/L (ref 3.5–5.1)
Q-T INTERVAL: 358 MS
QRS DURATION: 82 MS
QTC CALCULATION (BEZET): 437 MS
R AXIS: 37 DEGREES
T AXIS: -22 DEGREES
UNIT VOLUME: 350 ML
VENTRICULAR RATE: 90 BPM

## 2025-01-09 PROCEDURE — 99232 SBSQ HOSP IP/OBS MODERATE 35: CPT | Performed by: HOSPITALIST

## 2025-01-09 RX ORDER — POTASSIUM CHLORIDE 14.9 MG/ML
20 INJECTION INTRAVENOUS ONCE
Status: COMPLETED | OUTPATIENT
Start: 2025-01-09 | End: 2025-01-09

## 2025-01-09 RX ORDER — OSELTAMIVIR PHOSPHATE 75 MG/1
75 CAPSULE ORAL 2 TIMES DAILY
Status: COMPLETED | OUTPATIENT
Start: 2025-01-09 | End: 2025-01-13

## 2025-01-09 NOTE — PROGRESS NOTES
[facility]  Progress Note    Georgia Thompson Patient Status:  Inpatient    1945 MRN [id number]   [facility] 3NE-A Attending Burak Conn MD   Hosp Day # 2 PCP CHAVO MCHUGH, FREDRICK     Subjective:  Georgia Thompson is a(n) 79 year old female.         Current complaints: had (+) influenza    Denies n/v, abd pain, overt gi bleeding    States she liked the surgeon.  She wants outpt f/u w/ surg for hiatal hernia    Reviewed the egd/colon w/ her and risk of other findings    Had iron today as well    Denies new issues      Current Facility-Administered Medications   Medication Dose Route Frequency    potassium chloride 40 mEq in 250mL sodium chloride 0.9% IVPB premix  40 mEq Intravenous Once    Followed by    potassium chloride 20 mEq/100mL IVPB premix 20 mEq  20 mEq Intravenous Once    sodium ferric gluconate (Ferrlecit) 125 mg in sodium chloride 0.9% 100mL IVPB premix  125 mg Intravenous Daily    butalbital-acetaminophen-caffeine (Fioricet) -40 MG per tab 1 tablet  1 tablet Oral Q4H PRN    pantoprazole (Protonix) 40 mg in sodium chloride 0.9% PF 10 mL IV push  40 mg Intravenous Q12H    ALPRAZolam (Xanax) tab 0.5 mg  0.5 mg Oral TID PRN    escitalopram (Lexapro) tab 20 mg  20 mg Oral QAM    folic acid (Folvite) tab 1 mg  1 mg Oral Daily    thiamine (Vitamin B1) tab 100 mg  100 mg Oral Daily    multivitamin (Tab-A-Amari/Beta Carotene) tab 1 tablet  1 tablet Oral Daily    acetaminophen (Tylenol Extra Strength) tab 500 mg  500 mg Oral Q4H PRN    melatonin tab 3 mg  3 mg Oral Nightly PRN    ondansetron (Zofran) 4 MG/2ML injection 4 mg  4 mg Intravenous Q6H PRN    metoclopramide (Reglan) 5 mg/mL injection 5 mg  5 mg Intravenous Q8H PRN    polyethylene glycol (PEG 3350) (Miralax) 17 g oral packet 17 g  17 g Oral Daily PRN    sennosides (Senokot) tab 17.2 mg  17.2 mg Oral Nightly PRN    bisacodyl (Dulcolax) 10 MG rectal suppository 10 mg  10 mg Rectal Daily PRN    fleet enema (Fleet) rectal enema 133  mL  1 enema Rectal Once PRN    benzonatate (Tessalon) cap 200 mg  200 mg Oral TID PRN    guaiFENesin (Robitussin) 100 MG/5 ML oral liquid 200 mg  200 mg Oral Q4H PRN    glycerin-hypromellose- (Artificial Tears) 0.2-0.2-1 % ophthalmic solution 1 drop  1 drop Both Eyes QID PRN    sodium chloride (Saline Mist) 0.65 % nasal solution 1 spray  1 spray Each Nare Q3H PRN        Objective:    /51 (BP Location: Left arm)   Pulse 84   Temp 99.3 °F (37.4 °C) (Oral)   Resp 18   Ht 5' 10\" (1.778 m)   Wt 163 lb (73.9 kg)   SpO2 (!) 84%   BMI 23.39 kg/m²   General appearance: alert, appears stated age, and cooperative  Lungs: clear to auscultation bilaterally  Heart: reg rate    Abdomen: soft, non-tender; bowel sounds normal; no masses,  no organomegaly , no pain even w/ deep palpation  Extremities: no le edema  Neurologic: Grossly normal, ox 3, no asterixis on my exam. appropriate        Labs:     Recent Labs   Lab 01/07/25  1046 01/07/25  1630 01/08/25  0748 01/08/25  1630 01/09/25  0802   RBC 2.80*  --  3.28*  --   --    HGB 7.4*   < > 8.6*   < > 7.7*   HCT 23.7*  --  26.8*  --   --    MCV 84.6  --  81.7  --   --    MCH 26.4  --  26.2  --   --    MCHC 31.2  --  32.1  --   --    RDW 15.3  --  15.2  --   --    NEPRELIM 4.12  --  5.94  --   --    WBC 5.8  --  6.9  --   --    .0  --  254.0  --   --     < > = values in this interval not displayed.       Lab Results   Component Value Date    K 3.0 01/09/2025             )             Assessment and Plan:  Patient Active Problem List   Diagnosis    Anxiety    Panic attack    Chronic prescription benzodiazepine use    Anxiety and depression    Severe alcohol use disorder (HCC)    Former smoker    Gastroesophageal reflux disease, esophagitis presence not specified    Lack of appetite    Alcoholic intoxication with complication (HCC)    COVID-19    Suicidal ideation    Hypokalemia    COVID-19 virus infection    Substance induced mood disorder (HCC)    Alcohol  withdrawal syndrome with complication (HCC)    Alcohol abuse    Melanotic stools    Gastrointestinal hemorrhage, unspecified gastrointestinal hemorrhage type    Anemia, unspecified type    Blunt head trauma, initial encounter            1 hiatal hernia  2 iron deficiency anemia    Doing well.  Hg stable after transfusion    She denies gi symptoms currently.    May have anemia from [place]'s erosions from large haital hernia but no active bleeding on egd/colon    Multiple options for how to proceed were discussed in detail with pt/family, they are agreeable to the following plan ...  --okay to adv diet   --she can f/u surgery for the hiatal hernia and she again wanted to have this addressed before considering other management/eval for anemia  --recommend outpt fe therapy w/ pcp      3 influenza -- as per hospitalist      Will sign off for now, pls call w/ questions      --patient demonstrated understanding of the results and recommendations and risks, benefits, limitations, and alternatives to the plan. All of their questions and concerns were addressed and were agreeable to the plan as listed      García Holt MD

## 2025-01-09 NOTE — PROGRESS NOTES
[facility]   part of [facility]     Hospitalist Progress Note     Georgia Thompson Patient Status:  Inpatient    1945 MRN [id number]   Location [facility] 3NE-A Attending Burak Conn MD   Hosp Day # 2 PCP CHAVO MCHUGH, FREDRICK     Subjective:   SOB, feels wrecked. No CP or fevers     Objective:    Review of Systems:   A comprehensive review of systems was completed; pertinent positive and negatives stated in subjective.  Vital signs:  Temp:  [98.4 °F (36.9 °C)-100.8 °F (38.2 °C)] 99.1 °F (37.3 °C)  Pulse:  [] 82  Resp:  [11-20] 18  BP: (114-148)/(53-88) 129/53  SpO2:  [91 %-100 %] 93 %  Physical Exam:    General: No acute distress    Respiratory: diminished b/l ,no wheezes, no rhonchi  Cardiovascular: S1, S2, RRR  Abdomen: Soft, NT/ND, +BS  Extremities: no edema    Diagnostic Data:    Labs:  Recent Labs   Lab 25  1046 25  1630 25  2342 25  0748 25  1630 25  0236   WBC 5.8  --   --  6.9  --   --    HGB 7.4* 6.8* 9.8* 8.6* 7.8* 7.6*   MCV 84.6  --   --  81.7  --   --    .0  --   --  254.0  --   --    INR 1.08  --   --   --   --   --      Recent Labs   Lab 25  1046 25  0748 25  0236   * 111*  --    BUN 10 5*  --    CREATSERUM 0.85 0.74  --    CA 9.5 8.8  --    ALB 3.9  --   --     143  --    K 3.1* 3.2* 3.0*    108  --    CO2 27.0 26.0  --    ALKPHO 60  --   --    AST 23  --   --    ALT 10  --   --    BILT 0.4  --   --    TP 6.5  --   --      Estimated Creatinine Clearance: 68.8 mL/min (based on SCr of 0.74 mg/dL).  Recent Labs   Lab 25  1046   PTP 13.8   INR 1.08        Microbiology  No results found for this visit on 25.  Imaging: Reviewed in Epic.  Medications:    potassium chloride  40 mEq Intravenous Once    Followed by    potassium chloride  20 mEq Intravenous Once    sodium ferric gluconate  125 mg Intravenous Daily    pantoprazole  40 mg Intravenous Q12H    escitalopram  20 mg Oral QAM    folic  acid  1 mg Oral Daily    thiamine  100 mg Oral Daily    multivitamin  1 tablet Oral Daily       Assessment & Plan:      #Acute blood loss anemia due to GI bleed given dark stool  #Large Hiatal hernia  -Monitor hgb  -transfuse PRBCs and good response   -IV PPI BID  -GI consulted  -EGD/colonoscopy 1/8 - per GI   -SAIGE- add iron      #Falls, dizziness  -Unclear if related to above  -CT head unremarkable  -PT/OT    # +Influenza with hypoxia  - start tamiflu   -IS  -wean O2 as tolerated     #Alcohol abuse disorder  -MVI, folic acid, thiamine  -Last drink was New Year's  -Monitor for withdrawal and if shows signs will start CIWA protocol     #Anxiety and depression   -Lexapro  -PRN xanax    #Sinus tachycardia- asymptomatic and improved with IVF       #Hypokalemia  -Replace per protocol    #Headache 2/2 URI, dehydration  -fioricet   -CT head negative    #Incidental T12/L1 compression deformity- asymptomatic and suspect chronic - outpt f/u            Supplementary Documentation:   Quality:  DVT Mechanical Prophylaxis:   SCDs,    DVT Pharmacologic Prophylaxis   Medication   None                Code Status: Not on file  [place]: No urinary catheter in place  [place] Duration (in days):   Central line:    KB:   At this point Ms. Thompson is expected to be discharge to: home     The 21st Century Cures Act makes medical notes like these available to patients in the interest of transparency. Please be advised this is a medical document. Medical documents are intended to carry relevant information, facts as evident, and the clinical opinion of the practitioner. The medical note is intended as peer to peer communication and may appear blunt or direct. It is written in medical language and may contain abbreviations or verbiage that are unfamiliar.              **Certification      PHYSICIAN Certification of Need for Inpatient Hospitalization - Initial Certification    Patient will require inpatient services that will reasonably be expected  to span two midnight's based on the clinical documentation in H+P.   Based on patients current state of illness, I anticipate that, after discharge, patient will require TBD.

## 2025-01-09 NOTE — PLAN OF CARE
Patient alert and oriented x4. Low grade temp of 100.2F MD aware. On 2LO2 with sats 92-95%. No complaints of pain. No nausea and vomiting. No dark stool noted so far s/p EGD and Colonoscopy yesterday. Maintained on Clear liquid diet. Hgb monitored q8hrs. Ambulatory, up with assist + walker. Fall precautions in place. Call light within reach.  Will continue Plan of care.     Problem: GASTROINTESTINAL - ADULT  Goal: Maintains or returns to baseline bowel function  Description: INTERVENTIONS:  - Assess bowel function  - Maintain adequate hydration with IV or PO as ordered and tolerated  - Evaluate effectiveness of GI medications  - Encourage mobilization and activity  - Obtain nutritional consult as needed  - Establish a toileting routine/schedule  - Consider collaborating with pharmacy to review patient's medication profile  Outcome: Progressing     Problem: HEMATOLOGIC - ADULT  Goal: Maintains hematologic stability  Description: INTERVENTIONS  - Assess for signs and symptoms of bleeding or hemorrhage  - Monitor labs and vital signs for trends  - Administer supportive blood products/factors, fluids and medications as ordered and appropriate  - Administer supportive blood products/factors as ordered and appropriate  Outcome: Progressing  Goal: Free from bleeding injury  Description: (Example usage: patient with low platelets)  INTERVENTIONS:  - Avoid intramuscular injections, enemas and rectal medication administration  - Ensure safe mobilization of patient  - Hold pressure on venipuncture sites to achieve adequate hemostasis  - Assess for signs and symptoms of internal bleeding  - Monitor lab trends  - Patient is to report abnormal signs of bleeding to staff  - Avoid use of toothpicks and dental floss  - Use electric shaver for shaving  - Use soft bristle tooth brush  - Limit straining and forceful nose blowing  Outcome: Progressing     Problem: PAIN - ADULT  Goal: Verbalizes/displays adequate comfort level or  patient's stated pain goal  Description: INTERVENTIONS:  - Encourage pt to monitor pain and request assistance  - Assess pain using appropriate pain scale  - Administer analgesics based on type and severity of pain and evaluate response  - Implement non-pharmacological measures as appropriate and evaluate response  - Consider cultural and social influences on pain and pain management  - Manage/alleviate anxiety  - Utilize distraction and/or relaxation techniques  - Monitor for opioid side effects  - Notify MD/LIP if interventions unsuccessful or patient reports new pain  - Anticipate increased pain with activity and pre-medicate as appropriate  Outcome: Progressing

## 2025-01-09 NOTE — PHYSICAL THERAPY NOTE
PHYSICAL THERAPY EVALUATION - INPATIENT     Room Number: 3615/3615-A  Evaluation Date: 1/9/2025  Type of Evaluation: Initial  Physician Order: PT Eval and Treat    Presenting Problem: GI hemorrhage  Co-Morbidities : Anxiety, panic attack, depression, alcohol use d/o, COVID-19  Reason for Therapy: Mobility Dysfunction and Discharge Planning    PHYSICAL THERAPY ASSESSMENT   Patient is a 79 year old female admitted 1/7/2025 for  GI hemorrhage.  Prior to admission, patient's baseline is IND.  Patient is currently functioning near baseline with bed mobility, transfers, and gait.  Patient is requiring contact guard assist as a result of the following impairments: decreased endurance/aerobic capacity, pain, impaired Gait balance, and decreased muscular endurance.  Physical Therapy will continue to follow for duration of hospitalization.    Patient will benefit from continued skilled PT Services at discharge to promote prior level of function.  Anticipate patient will return home with home health PT.    PLAN DURING HOSPITALIZATION  Nursing Mobility Recommendation : 1 Assist  PT Device Recommendation: Rolling walker  PT Treatment Plan: Bed mobility;Body mechanics;Endurance;Gait training;Strengthening;Transfer training;Stair training;Balance training           CURRENT GOALS    Goal #1 Patient is able to demonstrate supine - sit EOB @ level: modified independent     Goal #2 Patient is able to demonstrate transfers EOB to/from BSC at assistance level: supervision     Goal #3 Patient is able to ambulate 150 feet with assist device: walker - rolling at assistance level: supervision     Goal #4    Goal #5    Goal #6    Goal Comments: Goals established on 1/9/2025      PHYSICAL THERAPY MEDICAL/SOCIAL HISTORY  History related to current admission: Patient is a 79 year old female admitted on 1/7/2025 from Home for Fall with head injury.  Pt diagnosed with GI hemorrhage.    HOME SITUATION  [facility]  Home Layout: One level                      Lives With: Significant other (of 35 years)    Drives: No   Patient Regularly Uses: Reading glasses      Prior Level of [place]: Pt lives at home with partner. Pt reports that she is IND with her ADLs and does not use an assisted device to ambulate.     SUBJECTIVE  \"I can't believe I need oxygen\"    OBJECTIVE  Precautions: Bed/chair alarm (o2 checks)  Fall Risk: High fall risk    WEIGHT BEARING RESTRICTION     PAIN ASSESSMENT  Ratin  Location: Pt reports no pain       COGNITION  Overall Cognitive Status:  WFL - within functional limits    RANGE OF MOTION AND STRENGTH ASSESSMENT  Upper extremity ROM and strength are within functional limits     Lower extremity ROM is within functional limits     Lower extremity strength is within functional limits     BALANCE  Static Sitting: Fair +  Dynamic Sitting: Fair +  Static Standing: Fair  Dynamic Standing: Fair -    ADDITIONAL TESTS                                    ACTIVITY TOLERANCE                         O2 WALK  Oxygen Therapy  SPO2% on Room Air at Rest: 84  SPO2% on Oxygen at Rest: 96  At rest oxygen flow (liters per minute): 2  SPO2% Ambulation on Room Air: 84  SPO2% Ambulation on Oxygen: 94  Ambulation oxygen flow (liters per minute): 2    NEUROLOGICAL FINDINGS                        AM-PAC '6-Clicks' INPATIENT SHORT FORM - BASIC MOBILITY  How much difficulty does the patient currently have...  Patient Difficulty: Turning over in bed (including adjusting bedclothes, sheets and blankets)?: A Little   Patient Difficulty: Sitting down on and standing up from a chair with arms (e.g., wheelchair, bedside commode, etc.): A Little   Patient Difficulty: Moving from lying on back to sitting on the side of the bed?: A Little   How much help from another person does the patient currently need...   Help from Another: Moving to and from a bed to a chair (including a wheelchair)?: A Little   Help from Another: Need to walk in hospital room?: A Little    Help from Another: Climbing 3-5 steps with a railing?: A Little     AM-PAC Score:  Raw Score: 18   Approx Degree of Impairment: 46.58%   Standardized Score (AM-PAC Scale): 43.63   CMS Modifier (G-Code): CK    FUNCTIONAL ABILITY STATUS  Gait Assessment   Functional Mobility/Gait Assessment  Gait Assistance: Contact guard assist  Distance (ft): 120  Assistive Device: Rolling walker  Pattern: Shuffle    Skilled Therapy Provided     Bed Mobility:  Rolling: NT  Supine to sit: Sup   Sit to supine: NT     Transfer Mobility:  Sit to stand: CGA   Stand to sit: CGA  Gait = CGA 120ft using RW    Therapist's Comments: Pt started at RA however while standing pt SpO2% dropped to 85%. Pt was given 2 Ls and improved to >90%. Pt was observed with no LOB when ambulating.     Exercise/Education Provided:  Bed mobility  Body mechanics  Gait training  Transfer training    Patient End of Session: Up in chair;Needs met;Call light within reach;RN aware of session/findings;All patient questions and concerns addressed;Family present      Patient Evaluation Complexity Level:  History Moderate - 1 or 2 personal factors and/or co-morbidities   Examination of body systems Low -  addressing 1-2 elements   Clinical Presentation Low- Stable   Clinical Decision Making Low Complexity       PT Session Time: 23 minutes  Therapeutic Activity: 15 minutes

## 2025-01-09 NOTE — DISCHARGE INSTRUCTIONS
Here are the biopsy/pathology findings from your recent EGD (uppper endoscopy) and colonoscopy:    The biopsy/pathology findings from your upper endoscopy showed:  --stomach biopsies did not show H.pylori infection  --small bowel biopsies did not have celiac disease      The  biopsy/pathology findings from your colonoscopy showed:  --The polyp removed were a combination of \"adenomatous\" (precancerous polyps) and \"hyperplastic\" polyps (these are benign polyps that are not thought to be precancerous).        Follow-up information: as we discussed today,  --continue to follow up with the surgeon for the hiatal hernia  --follow up with your primary care doctor for iron therapy and monitoring of anemia      If you need any further assistance, please feel free to call [phone].    Thank you for letting us care for you .    Sincerely ,     García Holt MD  [facility]  [phone]      For assistance in finding in home care please call:  Essie at [facility] [phone] Email [email or web address]  [facility] [phone]  [employer]  [phone]     Sometimes managing your health at home requires assistance.  The [facility] team has recognized your preference to use [facility].  They can be reached by phone at [phone].  The fax number for your reference is [phone].. A representative from the home health agency will contact you or your family to schedule your first visit.      -[facility] - [phone] [email or web address]  -[employer] [facility] [phone] or [phone] [email or web address]  -[employer] [phone] [email or web address]    -[place] [facility] [phone]  -[facility] Transit System [phone]  [facility] Transportation [phone] Ext. [phone]   -[facility]  Dial-a-Ride Service  Reservations: [phone]  -[facility] Senior Shuttle Bus Line at [phone]  -[facility] Senior Bus Service [phone]  -[facility] [phone].  -[facility] Transit [phone]  -[facility] [phone]   -[employer] Transportation [phone]  -[employer] Transportation [phone] [email or web address]  -[employer] [phone]  -Disabled on the Go [phone]  -[employer] Transfer [phone]  -[facility] [phone]   -[employer]: [phone]  -[facility] mobile [place] [phone]  -[employer] [phone]  -[employer] [facility] [place], [facility] [place], and [facility]. [phone] [email or web address]  -[employer] Transportation [facility] and surrounding communities [phone] [email or web address]

## 2025-01-09 NOTE — PROGRESS NOTES
Path returned    Reviewed w/ pt.    No hpylori/celiac on egd bx, adenoma/hyperplastic polyps on cscope, no malignancy    Options reviewed w/ her    --no further routine cscope planned given her age, she did not want further cscopes or other routine evals for polyps/crc  --f/u pcp for fe therapy and anemia and w/ surgery regarding hiat hernia  --results note put into dc instructions and copied below the path results      No further inpt gi recs      Will sign off for now, pls call w/ questions     pt demonstrated understanding of risks of morbidity/mortality if diagnoses and/or treatments were delayed balanced against risks of further investigation and/or treatment.       García Holt MD  [facility]                    Collected 1/8/2025 11:17       Status: Final result       Visible to patient: No (inaccessible in MyChart)    0 Result Notes        Component      Case Report     Surgical Pathology                                Case: [id number]                                     Authorizing Provider:  García Holt MD      Collected:           01/08/2025 11:17 AM             Ordering Location:     [facility]  Received:            01/08/2025 12:24 PM                                    [facility]                                                                     Pathologist:           Jason Delaney MD                                                               Specimens:   A) - Duodenum, Duodenum bx                                                                            B) - Gastric biopsy                                                                                    C) - Colon transverse, transverse colon polyp x2                                                      D) - Colon ascending, ascending colon polp x1                                                  Final Diagnosis:     A. Duodenal biopsy:  -Duodenal mucosa with no significant pathologic change.  -No  morphologic evidence of celiac disease.     B. Gastric biopsy:  -Gastric antral mucosa with reactive gastropathy.  -Gastric fundic mucosa with no significant pathologic change.  -No Helicobacter pylori organisms seen.     C. Transverse colon polyps x 2:  -Fragments of tubular adenoma and hyperplastic polyp.  -Negative for malignancy.       D. Ascending colon polyp:  -Tubular adenoma.  -Negative for malignancy.        Electronically signed by Jason Delaney MD on 1/9/2025 at 1335        Clinical Information      [facility].  Hiatal hernia, iron deficiency.     Gross Description      A: The specimen is received in formalin, labeled with the patient's name, demographics and \" duodenum BX\". It consists of multiple fragments of pink-tan mucosa aggregating 0.6 x 0.4 x 0.2 cm. The specimen is submitted entirely in A1.     B: The specimen is received in formalin, labeled with the patient's name, demographics and \" gastric BX\". It consists of multiple fragments of pink-tan mucosa aggregating 0.7 x 0.5 x 0.2 cm. The specimen is submitted entirely in B1     C: The specimen is received in formalin, labeled with the patient's name, demographics and \" transverse colon polyp x 2\". It consists of multiple polypoid fragments of pink-tan mucosa aggregating to 0.9 x 0.5 x 0.3 cm. The specimen is submitted entirely in C1.     D: The specimen is received in formalin, labeled with the patient's name, demographics and \" ascending colon polyp x 1\". It consists of 3-minute fragments of pink-tan mucosa aggregating 0.2 x 0.2 x 0.1 cm. The specimen is submitted entirely in D1.     (RD)        Resulting [facility] ([facility])               Specimen Collected: 01/08/25 11:17 Last Resulted: 01/09/25 13:35       Order Details        View Encounter        Lab and Collection Details        Routing        Result History     View All Conversations on this Encounter     Scans on Order [id number]    Document on 1/9/2025  1:35 PM by Jason Delaney MD                          Here are the biopsy/pathology findings from your recent EGD (uppper endoscopy) and colonoscopy:    The biopsy/pathology findings from your upper endoscopy showed:  --stomach biopsies did not show H.pylori infection  --small bowel biopsies did not have celiac disease      The  biopsy/pathology findings from your colonoscopy showed:  --The polyp removed were a combination of \"adenomatous\" (precancerous polyps) and \"hyperplastic\" polyps (these are benign polyps that are not thought to be precancerous).        Follow-up information: as we discussed today,  --continue to follow up with the surgeon for the hiatal hernia  --follow up with your primary care doctor for iron therapy and monitoring of anemia      If you need any further assistance, please feel free to call [phone].    Thank you for letting us care for you .    Sincerely ,     García Holt MD  [facility]  [phone]

## 2025-01-09 NOTE — OCCUPATIONAL THERAPY NOTE
OCCUPATIONAL THERAPY EVALUATION - INPATIENT     Room Number: 3615/3615-A  Evaluation Date: 1/9/2025  Type of Evaluation: Initial  Presenting Problem: Fall, headache, black stools; +Influenza, anemia, GIB, large hiatal hernia    Physician Order: IP Consult to Occupational Therapy  Reason for Therapy: ADL/IADL Dysfunction and Discharge Planning    OCCUPATIONAL THERAPY ASSESSMENT   Patient is currently functioning near baseline with self-care and functional mobility.   Prior to admission, patient's baseline is mod I.    Patient is requiring minimum assistance as a result of the following impairments: decreased functional strength, decreased functional reach, and decreased endurance.   Occupational Therapy will continue to follow for duration of hospitalization.    Patient will benefit from continued skilled OT Services at discharge to promote prior level of function.  Anticipate patient will return home with home health OT    History Related to Current Admission: Patient is a 79 year old female admitted on 1/7/2025 with Presenting Problem: Fall, headache, black stools; +Influenza, anemia, GIB, large hiatal hernia. Co-Morbidities : Anxiety, panic attack, depression, alcohol use d/o, COVID-19    EVALUATION SESSION    Patient Start of Session: supine    FUNCTIONAL TRANSFER ASSESSMENT  Sit to Stand: Edge of Bed; Chair  Edge of Bed: Stand-by Assist  Chair: Stand-by Assist  Toilet Transfer: Stand-by Assist    BED MOBILITY  Rolling: Modified Independent  Supine to Sit : Modified Independent  Sit to Supine (OT): Modified Independent  Scooting: mod I    BALANCE ASSESSMENT  Static Sitting: Independent  Static Standing: Supervision    FUNCTIONAL ADL ASSESSMENT  Eating: Modified Independent  Grooming Seated: Modified Independent  LB Dressing Seated: Supervision  Toileting Seated: Supervision    ACTIVITY TOLERANCE:   Pulse: 84  Heart Rate Source: Monitor     BP: 106/51  BP Location: Left arm  BP Method: Automatic  Patient Position:  Sitting    O2 SATURATIONS  Oxygen Therapy  SPO2% on Room Air at Rest: 84  SPO2% on Oxygen at Rest: 96  At rest oxygen flow (liters per minute): 2  SPO2% Ambulation on Room Air: 84  SPO2% Ambulation on Oxygen: 94  Ambulation oxygen flow (liters per minute): 2    Cognition  Alert, oriented x 3    Upper extremity  BUE AROM WFL    EDUCATION PROVIDED  Patient Education : Role of Occupational Therapy; Plan of Care; Discharge Recommendations; DME Recommendations; Functional Transfer Techniques; Fall Prevention; Weight Bear Status; Posture/Positioning; Edema Reduction; Energy Conservation; Proper Body Mechanics  Patient's Response to Education: Verbalized Understanding; Requires Further Education    Equipment used: RW  Demonstrates functional use    Patient End of Session: Up in chair;Needs met;Call light within reach;RN aware of session/findings;All patient questions and concerns addressed;Alarm set;Discussed recommendations with /;Hospital anti-slip socks    OCCUPATIONAL PROFILE    HOME SITUATION  Type of Home: Condo  Home Layout: One level  Lives With: Significant other (of 35 years)    Toilet and Equipment: Standard height toilet  Shower/Tub and Equipment: Walk-in shower;Grab bar  Other Equipment:  (walker but does not use)    Occupation/Status: retired  Hand Dominance: Right  Drives: No  Patient Regularly Uses: Reading glasses    Prior Level of Function:  Independent with mobility and self care. Lives in a one level condo with significant other who just had 2 CVA and is receiving HHPT. Patient reported that he is able to complete his own ADLs. Patient manages her meds and his as well    SUBJECTIVE   PAIN ASSESSMENT  Ratin  Location: headache  Management Techniques: Nurse notified    OBJECTIVE  Precautions: Bed/chair alarm (o2 checks)  Fall Risk: High fall risk    WEIGHT BEARING RESTRICTION       ASSESSMENTS    AM-PAC '6-Clicks' Inpatient Daily Activity Short Form  -   Putting on and taking  off regular lower body clothing?: A Little  -   Bathing (including washing, rinsing, drying)?: A Little  -   Toileting, which includes using toilet, bedpan or urinal? : A Little  -   Putting on and taking off regular upper body clothing?: None  -   Taking care of personal grooming such as brushing teeth?: None  -   Eating meals?: None    AM-PAC Score:  Score: 21  Approx Degree of Impairment: 32.79%  Standardized Score (AM-PAC Scale): 44.27    PLAN  OT Device Recommendations: TBD  OT Treatment Plan: Balance activities;Energy conservation/work simplification techniques;ADL training;Functional transfer training;UE strengthening/ROM;Endurance training;Patient/Family education;Patient/Family training;Cognitive reorientation;Equipment eval/education;Compensatory technique education  Rehab Potential : Good  Frequency: 3-5x/week  Number of Visits to Meet Established Goals: 7    ADL Goals  Patient will perform toileting with supervision and AE PRN  Patient will perform LB dressing with supervision and AE PRN    Functional Transfer Goals  Patient will perform bed mobility supine to sit with supervision  Patient will perform bed mobility sit to supine with supervision  Patient will perform toilet transfer with supervision    Additional Goals:  Patient will state precautions and maintain during ADL      Patient Evaluation Complexity Level:   Occupational Profile/Medical History MODERATE - Expanded review of history including review of medical or therapy record   Specific performance deficits impacting engagement in ADL/IADL LOW  1 - 3 performance deficits    Client Assessment/Performance Deficits LOW - No comorbidities nor modifications of tasks    Clinical Decision Making LOW - Analysis of occupational profile, problem-focused assessments, limited treatment options    Overall Complexity LOW     OT Session Time: 30 minutes  Self-Care Home Management: 15 minutes

## 2025-01-09 NOTE — PLAN OF CARE
--Pt Aox4, 3L, NSR, VSS, denies pain  --Droplet isolation for +Influenza  --Fall precautions  --Patient has non-productive cough, but states she feels better today than yesterday.  Incentive spirometer provided to patient with instruction on use. Demonstrated proper usage and is using it hourly.    --Tried to wean patient off nasal cannula, but SpO2 goes to mid 80%'s. Room air is baseline.  --K+ 3.0, replaced per electrolyte protocol  --Up in chair, diet advanced  --Tamiflu started  --PRN Xanax given  --Updated on plan of care    Problem: METABOLIC/FLUID AND ELECTROLYTES - ADULT  Goal: Electrolytes maintained within normal limits  Description: INTERVENTIONS:  - Monitor labs and rhythm and assess patient for signs and symptoms of electrolyte imbalances  - Administer electrolyte replacement as ordered  - Monitor response to electrolyte replacements, including rhythm and repeat lab results as appropriate  - Fluid restriction as ordered  - Instruct patient on fluid and nutrition restrictions as appropriate  Outcome: Progressing

## 2025-01-09 NOTE — PLAN OF CARE
Pt Aox4, room air, ST, VSS, denies pain  EGD and colonoscopy today  Full liquid diet started  Patient + for Influenza A, isolation precautions in place  PRN cough suppressant given  Fever 100.8 Tylenol give  IVF increased to 125 ml/hr  Hgb A8H      Problem: METABOLIC/FLUID AND ELECTROLYTES - ADULT  Goal: Electrolytes maintained within normal limits  Description: INTERVENTIONS:  - Monitor labs and rhythm and assess patient for signs and symptoms of electrolyte imbalances  - Administer electrolyte replacement as ordered  - Monitor response to electrolyte replacements, including rhythm and repeat lab results as appropriate  - Fluid restriction as ordered  - Instruct patient on fluid and nutrition restrictions as appropriate  Outcome: Progressing

## 2025-01-10 ENCOUNTER — APPOINTMENT (OUTPATIENT)
Dept: GENERAL RADIOLOGY | Facility: HOSPITAL | Age: 80
End: 2025-01-10
Attending: HOSPITALIST
Payer: MEDICARE

## 2025-01-10 PROBLEM — R09.02 HYPOXIA: Status: ACTIVE | Noted: 2025-01-10

## 2025-01-10 LAB
HGB BLD-MCNC: 8.1 G/DL
HGB BLD-MCNC: 8.1 G/DL

## 2025-01-10 PROCEDURE — 71045 X-RAY EXAM CHEST 1 VIEW: CPT | Performed by: HOSPITALIST

## 2025-01-10 PROCEDURE — 5A0935A ASSISTANCE WITH RESPIRATORY VENTILATION, LESS THAN 24 CONSECUTIVE HOURS, HIGH NASAL FLOW/VELOCITY: ICD-10-PCS | Performed by: HOSPITALIST

## 2025-01-10 PROCEDURE — 99232 SBSQ HOSP IP/OBS MODERATE 35: CPT | Performed by: HOSPITALIST

## 2025-01-10 RX ORDER — BISMUTH SUBSALICYLATE 262 MG/1
1 TABLET, CHEWABLE ORAL ONCE
Status: COMPLETED | OUTPATIENT
Start: 2025-01-10 | End: 2025-01-10

## 2025-01-10 NOTE — PLAN OF CARE
Assumed care at 1930  A&O x 4   3L O2 via NC - BL RA  High fiber low fat diet   Continent , purewick during the night   +1 & walker   PIV R forearm SL  Droplet precautions for influenza   Q8 hgb redraws  Pt denies pain  Safety precautions in place, bed in lowest position, call light within reach, updated on POC, all needs met at this time.        Problem: METABOLIC/FLUID AND ELECTROLYTES - ADULT  Goal: Electrolytes maintained within normal limits  Description: INTERVENTIONS:  - Monitor labs and rhythm and assess patient for signs and symptoms of electrolyte imbalances  - Administer electrolyte replacement as ordered  - Monitor response to electrolyte replacements, including rhythm and repeat lab results as appropriate  - Fluid restriction as ordered  - Instruct patient on fluid and nutrition restrictions as appropriate  Outcome: Progressing     Problem: HEMATOLOGIC - ADULT  Goal: Maintains hematologic stability  Description: INTERVENTIONS  - Assess for signs and symptoms of bleeding or hemorrhage  - Monitor labs and vital signs for trends  - Administer supportive blood products/factors, fluids and medications as ordered and appropriate  - Administer supportive blood products/factors as ordered and appropriate  Outcome: Progressing  Goal: Free from bleeding injury  Description: (Example usage: patient with low platelets)  INTERVENTIONS:  - Avoid intramuscular injections, enemas and rectal medication administration  - Ensure safe mobilization of patient  - Hold pressure on venipuncture sites to achieve adequate hemostasis  - Assess for signs and symptoms of internal bleeding  - Monitor lab trends  - Patient is to report abnormal signs of bleeding to staff  - Avoid use of toothpicks and dental floss  - Use electric shaver for shaving  - Use soft bristle tooth brush  - Limit straining and forceful nose blowing  Outcome: Progressing     Problem: GASTROINTESTINAL - ADULT  Goal: Maintains or returns to baseline bowel  function  Description: INTERVENTIONS:  - Assess bowel function  - Maintain adequate hydration with IV or PO as ordered and tolerated  - Evaluate effectiveness of GI medications  - Encourage mobilization and activity  - Obtain nutritional consult as needed  - Establish a toileting routine/schedule  - Consider collaborating with pharmacy to review patient's medication profile  Outcome: Progressing     Problem: PAIN - ADULT  Goal: Verbalizes/displays adequate comfort level or patient's stated pain goal  Description: INTERVENTIONS:  - Encourage pt to monitor pain and request assistance  - Assess pain using appropriate pain scale  - Administer analgesics based on type and severity of pain and evaluate response  - Implement non-pharmacological measures as appropriate and evaluate response  - Consider cultural and social influences on pain and pain management  - Manage/alleviate anxiety  - Utilize distraction and/or relaxation techniques  - Monitor for opioid side effects  - Notify MD/LIP if interventions unsuccessful or patient reports new pain  - Anticipate increased pain with activity and pre-medicate as appropriate  Outcome: Progressing     Problem: SAFETY ADULT - FALL  Goal: Free from fall injury  Description: INTERVENTIONS:  - Assess pt frequently for physical needs  - Identify cognitive and physical deficits and behaviors that affect risk of falls.  - [place] fall precautions as indicated by assessment.  - Educate pt/family on patient safety including physical limitations  - Instruct pt to call for assistance with activity based on assessment  - Modify environment to reduce risk of injury  - Provide assistive devices as appropriate  - Consider OT/PT consult to assist with strengthening/mobility  - Encourage toileting schedule  Outcome: Progressing     Problem: DISCHARGE PLANNING  Goal: Discharge to home or other facility with appropriate resources  Description: INTERVENTIONS:  - Identify barriers to discharge w/pt  and caregiver  - Include patient/family/discharge partner in discharge planning  - Arrange for needed discharge resources and transportation as appropriate  - Identify discharge learning needs (meds, wound care, etc)  - Arrange for interpreters to assist at discharge as needed  - Consider post-discharge preferences of patient/family/discharge partner  - Complete POLST form as appropriate  - Assess patient's ability to be responsible for managing their own health  - Refer to Case Management Department for coordinating discharge planning if the patient needs post-hospital services based on physician/LIP order or complex needs related to functional status, cognitive ability or social support system  Outcome: Progressing

## 2025-01-10 NOTE — CM/SW NOTE
Chart reviewed and noted therapy is anticipating pt will return home with home healthcare. Anticipated therapy need: Home with Home Healthcare  Per chart review, pt has hx with [facility] (formerly [facility]).     HH referrals sent in AIDIN, choice list will be provided once available. SW will continue to follow.    MICHELE Syed  Discharge Planner

## 2025-01-10 NOTE — PLAN OF CARE
Received patient from NOC RN at 0730. Patient A&O x4 and on room air, bilat lung sounds diminished to coarse. Patient voiding without complications and is reporting no pain at this time, resting in bed. Fall precautions in place, call light and belongings within reach. Plan of care evolving.     POC:   - tamiflu  - wean O2    Problem: METABOLIC/FLUID AND ELECTROLYTES - ADULT  Goal: Electrolytes maintained within normal limits  Description: INTERVENTIONS:  - Monitor labs and rhythm and assess patient for signs and symptoms of electrolyte imbalances  - Administer electrolyte replacement as ordered  - Monitor response to electrolyte replacements, including rhythm and repeat lab results as appropriate  - Fluid restriction as ordered  - Instruct patient on fluid and nutrition restrictions as appropriate  Outcome: Progressing     Problem: HEMATOLOGIC - ADULT  Goal: Maintains hematologic stability  Description: INTERVENTIONS  - Assess for signs and symptoms of bleeding or hemorrhage  - Monitor labs and vital signs for trends  - Administer supportive blood products/factors, fluids and medications as ordered and appropriate  - Administer supportive blood products/factors as ordered and appropriate  Outcome: Progressing  Goal: Free from bleeding injury  Description: (Example usage: patient with low platelets)  INTERVENTIONS:  - Avoid intramuscular injections, enemas and rectal medication administration  - Ensure safe mobilization of patient  - Hold pressure on venipuncture sites to achieve adequate hemostasis  - Assess for signs and symptoms of internal bleeding  - Monitor lab trends  - Patient is to report abnormal signs of bleeding to staff  - Avoid use of toothpicks and dental floss  - Use electric shaver for shaving  - Use soft bristle tooth brush  - Limit straining and forceful nose blowing  Outcome: Progressing     Problem: PAIN - ADULT  Goal: Verbalizes/displays adequate comfort level or patient's stated pain  goal  Description: INTERVENTIONS:  - Encourage pt to monitor pain and request assistance  - Assess pain using appropriate pain scale  - Administer analgesics based on type and severity of pain and evaluate response  - Implement non-pharmacological measures as appropriate and evaluate response  - Consider cultural and social influences on pain and pain management  - Manage/alleviate anxiety  - Utilize distraction and/or relaxation techniques  - Monitor for opioid side effects  - Notify MD/LIP if interventions unsuccessful or patient reports new pain  - Anticipate increased pain with activity and pre-medicate as appropriate  Outcome: Progressing     Problem: SAFETY ADULT - FALL  Goal: Free from fall injury  Description: INTERVENTIONS:  - Assess pt frequently for physical needs  - Identify cognitive and physical deficits and behaviors that affect risk of falls.  - [place] fall precautions as indicated by assessment.  - Educate pt/family on patient safety including physical limitations  - Instruct pt to call for assistance with activity based on assessment  - Modify environment to reduce risk of injury  - Provide assistive devices as appropriate  - Consider OT/PT consult to assist with strengthening/mobility  - Encourage toileting schedule  Outcome: Progressing     Problem: DISCHARGE PLANNING  Goal: Discharge to home or other facility with appropriate resources  Description: INTERVENTIONS:  - Identify barriers to discharge w/pt and caregiver  - Include patient/family/discharge partner in discharge planning  - Arrange for needed discharge resources and transportation as appropriate  - Identify discharge learning needs (meds, wound care, etc)  - Arrange for interpreters to assist at discharge as needed  - Consider post-discharge preferences of patient/family/discharge partner  - Complete POLST form as appropriate  - Assess patient's ability to be responsible for managing their own health  - Refer to Case Management  Department for coordinating discharge planning if the patient needs post-hospital services based on physician/LIP order or complex needs related to functional status, cognitive ability or social support system  Outcome: Progressing     Problem: GASTROINTESTINAL - ADULT  Goal: Maintains or returns to baseline bowel function  Description: INTERVENTIONS:  - Assess bowel function  - Maintain adequate hydration with IV or PO as ordered and tolerated  - Evaluate effectiveness of GI medications  - Encourage mobilization and activity  - Obtain nutritional consult as needed  - Establish a toileting routine/schedule  - Consider collaborating with pharmacy to review patient's medication profile  Outcome: Progressing

## 2025-01-10 NOTE — CM/SW NOTE
01/10/25 1200   CM/SW Referral Data   Referral Source Social Work (self-referral)   Reason for Referral Discharge planning   Informant Patient;EMR   Medical Hx   Does patient have an established PCP? Yes   Patient Info   Patient's Current Mental Status at Time of Assessment Alert;Oriented   Patient's Home Environment Condo/Apt with elevator   Patient lives with Spouse/Significant other   Patient Status Prior to Admission   Independent with ADLs and Mobility Yes   Discharge Needs   Anticipated D/C needs Home health care   Choice of Post-Acute Provider   Informed patient of right to choose their preferred provider Yes   List of appropriate post-acute services provided to patient/family with quality data Yes   Patient/family choice [facility]   Information given to Patient     HOME SITUATION  Type of Home: Condo  Home Layout: One level                     Lives With: Significant other (of 35 years)    Drives: No   Patient Regularly Uses: Reading glasses       Prior Level of [place] per PT eval: Pt lives at home with partner. Pt reports that she is IND with her ADLs and does not use an assisted device to ambulate.  (Per PT eval)     SW self-referred for discharge planning. Pt is a 80 y/o female admitted with GI hemorrhage. HH referrals sent in AIDIN. MARCOS met with pt who is alert and oriented at bedside to discuss discharge planning.     Pt lives with her significant other and is typically independent with ADLs. Pt stated she does not have an assistive device and she does not wear oxygen. Pt currently observed to be wearing oxygen. Pt stated her significant other is able to drive short distances. Pt stated she does not need a caregiver but is interested in a care companion to assist with driving or grocery shopping. SW discussed hiring a caregiver and transportation resources.     Discussed HH services - [facility] only accepting agency. Pt agreeable with [facility].     [facility]  reserved in AIDIN. Caregiving information and transportation resources placed on AVS. SW will continue to follow.     MICHELE Syed  Discharge Planner

## 2025-01-10 NOTE — PROGRESS NOTES
[facility]   part of [facility]     Hospitalist Progress Note     Georgia Thompson Patient Status:  Inpatient    1945 MRN [id number]   Location [facility] 3NE-A Attending Burak Conn MD   Hosp Day # 3 PCP CHAVO MCHUGH, FREDRICK     Subjective:   Still SOB. No fevers.     Objective:    Review of Systems:   A comprehensive review of systems was completed; pertinent positive and negatives stated in subjective.  Vital signs:  Temp:  [98.7 °F (37.1 °C)-99.7 °F (37.6 °C)] 99.7 °F (37.6 °C)  Pulse:  [] 96  Resp:  [18] 18  BP: (106-151)/(51-88) 134/75  SpO2:  [84 %-97 %] 94 %  Physical Exam:    General: No acute distress   ill appearing   Respiratory: diminished b/l ,no wheezes, no rhonchi  Cardiovascular: S1, S2, RRR  Abdomen: Soft, NT/ND, +BS  Extremities: no edema    Diagnostic Data:    Labs:  Recent Labs   Lab 25  1046 25  1630 25  0748 25  1630 25  0236 25  0802 25  1652 01/10/25  0356   WBC 5.8  --  6.9  --   --   --   --   --    HGB 7.4*   < > 8.6* 7.8* 7.6* 7.7* 8.1* 8.1*   MCV 84.6  --  81.7  --   --   --   --   --    .0  --  254.0  --   --   --   --   --    INR 1.08  --   --   --   --   --   --   --     < > = values in this interval not displayed.     Recent Labs   Lab 25  1046 25  0748 25  0236 25  1652   * 111*  --   --    BUN 10 5*  --   --    CREATSERUM 0.85 0.74  --   --    CA 9.5 8.8  --   --    ALB 3.9  --   --   --     143  --   --    K 3.1* 3.2* 3.0* 3.6    108  --   --    CO2 27.0 26.0  --   --    ALKPHO 60  --   --   --    AST 23  --   --   --    ALT 10  --   --   --    BILT 0.4  --   --   --    TP 6.5  --   --   --      Estimated Creatinine Clearance: 68.8 mL/min (based on SCr of 0.74 mg/dL).  Recent Labs   Lab 25  1046   PTP 13.8   INR 1.08        Microbiology  Hospital Encounter on 25   1. Blood Culture     Status: None (Preliminary result)    Collection Time: 25  4:31  PM    Specimen: Blood,peripheral   Result Value Ref Range    Blood Culture Result No Growth 1 Day N/A     Imaging: Reviewed in Epic.  Medications:    oseltamivir  75 mg Oral BID    sodium ferric gluconate  125 mg Intravenous Daily    pantoprazole  40 mg Intravenous Q12H    escitalopram  20 mg Oral QAM    folic acid  1 mg Oral Daily    thiamine  100 mg Oral Daily    multivitamin  1 tablet Oral Daily       Assessment & Plan:      #Acute blood loss anemia due to GI bleed given dark stool  #Large Hiatal hernia  -hgb holding- stop Q8 and CBC in AM   -transfuse PRBCs  -PPI BID  -GI consulted  -EGD/colonoscopy 1/8 - per GI   -SAIGE- added iron      #Falls, dizziness- likely 2/2 weakness from flu   -Unclear if related to above  -CT head unremarkable  -PT/OT    # +Influenza with hypoxia  -check CXR as more hypoxic  -off IVF   -start tamiflu   -IS  -wean O2 as tolerated     #Alcohol abuse disorder  -MVI, folic acid, thiamine  -Last drink was New Year's  -Monitor for withdrawal and if shows signs will start CIWA protocol     #Anxiety and depression   -Lexapro  -PRN xanax    #Sinus tachycardia- asymptomatic and improved       #Hypokalemia  -Replace per protocol    #Headache 2/2 URI, dehydration  -fioricet   -CT head negative    #Incidental T12/L1 compression deformity- asymptomatic and suspect chronic - outpt f/u            Supplementary Documentation:   Quality:  DVT Mechanical Prophylaxis:   SCDs,    DVT Pharmacologic Prophylaxis   Medication   None                Code Status: Not on file  [place]: No urinary catheter in place  [place] Duration (in days):   Central line:    KB:   At this point Ms. Thompson is expected to be discharge to: home     The 21st Century Cures Act makes medical notes like these available to patients in the interest of transparency. Please be advised this is a medical document. Medical documents are intended to carry relevant information, facts as evident, and the clinical opinion of the practitioner. The  medical note is intended as peer to peer communication and may appear blunt or direct. It is written in medical language and may contain abbreviations or verbiage that are unfamiliar.              **Certification      PHYSICIAN Certification of Need for Inpatient Hospitalization - Initial Certification    Patient will require inpatient services that will reasonably be expected to span two midnight's based on the clinical documentation in H+P.   Based on patients current state of illness, I anticipate that, after discharge, patient will require TBD.

## 2025-01-11 LAB
ALBUMIN SERPL-MCNC: 3.2 G/DL (ref 3.2–4.8)
ALBUMIN/GLOB SERPL: 1.5 {RATIO} (ref 1–2)
ALP LIVER SERPL-CCNC: 44 U/L
ALT SERPL-CCNC: <7 U/L
ANION GAP SERPL CALC-SCNC: 2 MMOL/L (ref 0–18)
AST SERPL-CCNC: 18 U/L (ref ?–34)
BILIRUB SERPL-MCNC: 0.3 MG/DL (ref 0.2–1.1)
BUN BLD-MCNC: 5 MG/DL (ref 9–23)
CALCIUM BLD-MCNC: 8.3 MG/DL (ref 8.7–10.4)
CHLORIDE SERPL-SCNC: 105 MMOL/L (ref 98–112)
CO2 SERPL-SCNC: 34 MMOL/L (ref 21–32)
CREAT BLD-MCNC: 0.67 MG/DL
EGFRCR SERPLBLD CKD-EPI 2021: 90 ML/MIN/1.73M2 (ref 60–?)
ERYTHROCYTE [DISTWIDTH] IN BLOOD BY AUTOMATED COUNT: 15.6 %
GLOBULIN PLAS-MCNC: 2.1 G/DL (ref 2–3.5)
GLUCOSE BLD-MCNC: 94 MG/DL (ref 70–99)
HCT VFR BLD AUTO: 24.9 %
HGB BLD-MCNC: 7.8 G/DL
MCH RBC QN AUTO: 26.2 PG (ref 26–34)
MCHC RBC AUTO-ENTMCNC: 31.3 G/DL (ref 31–37)
MCV RBC AUTO: 83.6 FL
NT-PROBNP SERPL-MCNC: 651 PG/ML (ref ?–450)
OSMOLALITY SERPL CALC.SUM OF ELEC: 289 MOSM/KG (ref 275–295)
PLATELET # BLD AUTO: 204 10(3)UL (ref 150–450)
POTASSIUM SERPL-SCNC: 2.8 MMOL/L (ref 3.5–5.1)
POTASSIUM SERPL-SCNC: 3.3 MMOL/L (ref 3.5–5.1)
PROCALCITONIN SERPL-MCNC: 0.09 NG/ML (ref ?–0.05)
PROT SERPL-MCNC: 5.3 G/DL (ref 5.7–8.2)
RBC # BLD AUTO: 2.98 X10(6)UL
SODIUM SERPL-SCNC: 141 MMOL/L (ref 136–145)
WBC # BLD AUTO: 4.1 X10(3) UL (ref 4–11)

## 2025-01-11 PROCEDURE — 99233 SBSQ HOSP IP/OBS HIGH 50: CPT | Performed by: HOSPITALIST

## 2025-01-11 RX ORDER — GUAIFENESIN 600 MG/1
600 TABLET, EXTENDED RELEASE ORAL 2 TIMES DAILY
Status: DISCONTINUED | OUTPATIENT
Start: 2025-01-11 | End: 2025-01-17

## 2025-01-11 RX ORDER — IPRATROPIUM BROMIDE AND ALBUTEROL SULFATE 2.5; .5 MG/3ML; MG/3ML
3 SOLUTION RESPIRATORY (INHALATION)
Status: DISCONTINUED | OUTPATIENT
Start: 2025-01-11 | End: 2025-01-12

## 2025-01-11 RX ORDER — POTASSIUM CHLORIDE 1500 MG/1
40 TABLET, EXTENDED RELEASE ORAL ONCE
Status: COMPLETED | OUTPATIENT
Start: 2025-01-11 | End: 2025-01-11

## 2025-01-11 RX ORDER — POTASSIUM CHLORIDE 1500 MG/1
40 TABLET, EXTENDED RELEASE ORAL EVERY 4 HOURS
Status: COMPLETED | OUTPATIENT
Start: 2025-01-11 | End: 2025-01-11

## 2025-01-11 RX ORDER — PANTOPRAZOLE SODIUM 40 MG/1
40 TABLET, DELAYED RELEASE ORAL
Status: DISCONTINUED | OUTPATIENT
Start: 2025-01-11 | End: 2025-01-17

## 2025-01-11 RX ORDER — ENOXAPARIN SODIUM 100 MG/ML
40 INJECTION SUBCUTANEOUS NIGHTLY
Status: DISCONTINUED | OUTPATIENT
Start: 2025-01-11 | End: 2025-01-17

## 2025-01-11 NOTE — PLAN OF CARE
Assumed care at 0730  A/Ox4, 1L NC, VSS  Tele, NSR/ST  Denies n/v & pain   PRN xanax  K replaced per protocol   Low fat high fiber diet  Droplet isolation for influenza  PIV R forearm, SL  Scheduled nebs   Updated w/ POC  All needs met at this time    Problem: METABOLIC/FLUID AND ELECTROLYTES - ADULT  Goal: Electrolytes maintained within normal limits  Description: INTERVENTIONS:  - Monitor labs and rhythm and assess patient for signs and symptoms of electrolyte imbalances  - Administer electrolyte replacement as ordered  - Monitor response to electrolyte replacements, including rhythm and repeat lab results as appropriate  - Fluid restriction as ordered  - Instruct patient on fluid and nutrition restrictions as appropriate  Outcome: Progressing     Problem: HEMATOLOGIC - ADULT  Goal: Maintains hematologic stability  Description: INTERVENTIONS  - Assess for signs and symptoms of bleeding or hemorrhage  - Monitor labs and vital signs for trends  - Administer supportive blood products/factors, fluids and medications as ordered and appropriate  - Administer supportive blood products/factors as ordered and appropriate  Outcome: Progressing  Goal: Free from bleeding injury  Description: (Example usage: patient with low platelets)  INTERVENTIONS:  - Avoid intramuscular injections, enemas and rectal medication administration  - Ensure safe mobilization of patient  - Hold pressure on venipuncture sites to achieve adequate hemostasis  - Assess for signs and symptoms of internal bleeding  - Monitor lab trends  - Patient is to report abnormal signs of bleeding to staff  - Avoid use of toothpicks and dental floss  - Use electric shaver for shaving  - Use soft bristle tooth brush  - Limit straining and forceful nose blowing  Outcome: Progressing     Problem: GASTROINTESTINAL - ADULT  Goal: Maintains or returns to baseline bowel function  Description: INTERVENTIONS:  - Assess bowel function  - Maintain adequate hydration with IV  or PO as ordered and tolerated  - Evaluate effectiveness of GI medications  - Encourage mobilization and activity  - Obtain nutritional consult as needed  - Establish a toileting routine/schedule  - Consider collaborating with pharmacy to review patient's medication profile  Outcome: Progressing     Problem: PAIN - ADULT  Goal: Verbalizes/displays adequate comfort level or patient's stated pain goal  Description: INTERVENTIONS:  - Encourage pt to monitor pain and request assistance  - Assess pain using appropriate pain scale  - Administer analgesics based on type and severity of pain and evaluate response  - Implement non-pharmacological measures as appropriate and evaluate response  - Consider cultural and social influences on pain and pain management  - Manage/alleviate anxiety  - Utilize distraction and/or relaxation techniques  - Monitor for opioid side effects  - Notify MD/LIP if interventions unsuccessful or patient reports new pain  - Anticipate increased pain with activity and pre-medicate as appropriate  Outcome: Progressing     Problem: SAFETY ADULT - FALL  Goal: Free from fall injury  Description: INTERVENTIONS:  - Assess pt frequently for physical needs  - Identify cognitive and physical deficits and behaviors that affect risk of falls.  - [place] fall precautions as indicated by assessment.  - Educate pt/family on patient safety including physical limitations  - Instruct pt to call for assistance with activity based on assessment  - Modify environment to reduce risk of injury  - Provide assistive devices as appropriate  - Consider OT/PT consult to assist with strengthening/mobility  - Encourage toileting schedule  Outcome: Progressing     Problem: DISCHARGE PLANNING  Goal: Discharge to home or other facility with appropriate resources  Description: INTERVENTIONS:  - Identify barriers to discharge w/pt and caregiver  - Include patient/family/discharge partner in discharge planning  - Arrange for needed  discharge resources and transportation as appropriate  - Identify discharge learning needs (meds, wound care, etc)  - Arrange for interpreters to assist at discharge as needed  - Consider post-discharge preferences of patient/family/discharge partner  - Complete POLST form as appropriate  - Assess patient's ability to be responsible for managing their own health  - Refer to Case Management Department for coordinating discharge planning if the patient needs post-hospital services based on physician/LIP order or complex needs related to functional status, cognitive ability or social support system  Outcome: Progressing

## 2025-01-11 NOTE — PLAN OF CARE
Assumed care at 1930  A&O x 4   2L O2 via NC - BL RA  Reg/gen diet  Continent , purewick during the night   +1 & walker   PIV R forearm SL  Droplet precautions for influenza   Pt denies pain  Prn xanax   Prn Robitussin   Safety precautions in place, bed in lowest position, call light within reach, updated on POC, all needs met at this time.        Problem: METABOLIC/FLUID AND ELECTROLYTES - ADULT  Goal: Electrolytes maintained within normal limits  Description: INTERVENTIONS:  - Monitor labs and rhythm and assess patient for signs and symptoms of electrolyte imbalances  - Administer electrolyte replacement as ordered  - Monitor response to electrolyte replacements, including rhythm and repeat lab results as appropriate  - Fluid restriction as ordered  - Instruct patient on fluid and nutrition restrictions as appropriate  Outcome: Progressing     Problem: HEMATOLOGIC - ADULT  Goal: Maintains hematologic stability  Description: INTERVENTIONS  - Assess for signs and symptoms of bleeding or hemorrhage  - Monitor labs and vital signs for trends  - Administer supportive blood products/factors, fluids and medications as ordered and appropriate  - Administer supportive blood products/factors as ordered and appropriate  Outcome: Progressing  Goal: Free from bleeding injury  Description: (Example usage: patient with low platelets)  INTERVENTIONS:  - Avoid intramuscular injections, enemas and rectal medication administration  - Ensure safe mobilization of patient  - Hold pressure on venipuncture sites to achieve adequate hemostasis  - Assess for signs and symptoms of internal bleeding  - Monitor lab trends  - Patient is to report abnormal signs of bleeding to staff  - Avoid use of toothpicks and dental floss  - Use electric shaver for shaving  - Use soft bristle tooth brush  - Limit straining and forceful nose blowing  Outcome: Progressing     Problem: GASTROINTESTINAL - ADULT  Goal: Maintains or returns to baseline bowel  function  Description: INTERVENTIONS:  - Assess bowel function  - Maintain adequate hydration with IV or PO as ordered and tolerated  - Evaluate effectiveness of GI medications  - Encourage mobilization and activity  - Obtain nutritional consult as needed  - Establish a toileting routine/schedule  - Consider collaborating with pharmacy to review patient's medication profile  Outcome: Progressing     Problem: PAIN - ADULT  Goal: Verbalizes/displays adequate comfort level or patient's stated pain goal  Description: INTERVENTIONS:  - Encourage pt to monitor pain and request assistance  - Assess pain using appropriate pain scale  - Administer analgesics based on type and severity of pain and evaluate response  - Implement non-pharmacological measures as appropriate and evaluate response  - Consider cultural and social influences on pain and pain management  - Manage/alleviate anxiety  - Utilize distraction and/or relaxation techniques  - Monitor for opioid side effects  - Notify MD/LIP if interventions unsuccessful or patient reports new pain  - Anticipate increased pain with activity and pre-medicate as appropriate  Outcome: Progressing     Problem: SAFETY ADULT - FALL  Goal: Free from fall injury  Description: INTERVENTIONS:  - Assess pt frequently for physical needs  - Identify cognitive and physical deficits and behaviors that affect risk of falls.  - [place] fall precautions as indicated by assessment.  - Educate pt/family on patient safety including physical limitations  - Instruct pt to call for assistance with activity based on assessment  - Modify environment to reduce risk of injury  - Provide assistive devices as appropriate  - Consider OT/PT consult to assist with strengthening/mobility  - Encourage toileting schedule  Outcome: Progressing     Problem: DISCHARGE PLANNING  Goal: Discharge to home or other facility with appropriate resources  Description: INTERVENTIONS:  - Identify barriers to discharge w/pt  and caregiver  - Include patient/family/discharge partner in discharge planning  - Arrange for needed discharge resources and transportation as appropriate  - Identify discharge learning needs (meds, wound care, etc)  - Arrange for interpreters to assist at discharge as needed  - Consider post-discharge preferences of patient/family/discharge partner  - Complete POLST form as appropriate  - Assess patient's ability to be responsible for managing their own health  - Refer to Case Management Department for coordinating discharge planning if the patient needs post-hospital services based on physician/LIP order or complex needs related to functional status, cognitive ability or social support system  Outcome: Progressing

## 2025-01-11 NOTE — PROGRESS NOTES
[facility]   part of [facility]     Hospitalist Progress Note     Georgia Thompson Patient Status:  Inpatient    1945 MRN [id number]   Location [facility] 3NE-A Attending Hussein Dorado MD   Hosp Day # 4 PCP CHAVO MCHUGH, FREDRICK     Chief Complaint: GIB, Flu    Subjective:   Patient admits to shortness of breath. Admits to cough. On oxygen. Nauseated, but trying to eat.    Current medications:   guaiFENesin ER  600 mg Oral BID    pantoprazole  40 mg Oral QAM AC    potassium chloride  40 mEq Oral Q4H    ipratropium-albuterol  3 mL Nebulization 6 times per day    oseltamivir  75 mg Oral BID    escitalopram  20 mg Oral QAM    folic acid  1 mg Oral Daily    thiamine  100 mg Oral Daily    multivitamin  1 tablet Oral Daily       Objective:    Review of Systems:   10 point ROS completed and was negative, except for pertinent positive and negatives stated in subjective.    Vital signs:  Temp:  [97.7 °F (36.5 °C)-100.1 °F (37.8 °C)] 98.6 °F (37 °C)  Pulse:  [81-95] 84  Resp:  [17-18] 17  BP: (109-126)/(60-65) 120/64  SpO2:  [90 %-94 %] 90 %  No data found.  Physical Exam:    General: No acute distress.   Respiratory: Diffuse wheeze bilaterally, coarse  Cardiovascular: S1, S2. Regular   Abdomen: Soft, nontender, nondistended.  Positive bowel sounds.   Extremities: No edema.  Neuro: AAOx3    Diagnostic Data:    Labs:  Recent Labs   Lab 25  1046 25  1630 25  0748 25  1630 25  0802 25  1652 01/10/25  0356 01/10/25  0828 25  0748   WBC 5.8  --  6.9  --   --   --   --   --  4.1   HGB 7.4*   < > 8.6*   < > 7.7* 8.1* 8.1* 8.1* 7.8*   MCV 84.6  --  81.7  --   --   --   --   --  83.6   .0  --  254.0  --   --   --   --   --  204.0   INR 1.08  --   --   --   --   --   --   --   --     < > = values in this interval not displayed.       Recent Labs   Lab 25  1046 25  0748 25  0236 25  1652 2548   * 111*  --   --  94   BUN 10 5*  --    --  5*   CREATSERUM 0.85 0.74  --   --  0.67   CA 9.5 8.8  --   --  8.3*   ALB 3.9  --   --   --  3.2    143  --   --  141   K 3.1* 3.2* 3.0* 3.6 2.8*    108  --   --  105   CO2 27.0 26.0  --   --  34.0*   ALKPHO 60  --   --   --  44*   AST 23  --   --   --  18   ALT 10  --   --   --  <7*   BILT 0.4  --   --   --  0.3   TP 6.5  --   --   --  5.3*       Estimated Creatinine Clearance: 76 mL/min (based on SCr of 0.67 mg/dL).    Recent Labs   Lab 01/07/25  1046   PTP 13.8   INR 1.08            COVID-19 Lab Results    COVID-19  Lab Results   Component Value Date    COVID19 Not Detected 01/08/2025    COVID19 Not Detected 03/25/2024    COVID19 NOT DETECTED 09/29/2021       Pro-Calcitonin  Recent Labs   Lab 01/08/25  0748 01/11/25  0748   PCT 0.04 0.09*       Cardiac  Recent Labs   Lab 01/11/25  0748   PBNP 651*       Creatinine Kinase  No results for input(s): \"CK\" in the last 168 hours.    Inflammatory Markers  Recent Labs   Lab 01/07/25  1307   ERNST 9*       Recent Labs   Lab 01/07/25  1046   TROPHS <3       Imaging: Imaging data reviewed in Epic.    Medications:    guaiFENesin ER  600 mg Oral BID    pantoprazole  40 mg Oral QAM AC    potassium chloride  40 mEq Oral Q4H    ipratropium-albuterol  3 mL Nebulization 6 times per day    oseltamivir  75 mg Oral BID    escitalopram  20 mg Oral QAM    folic acid  1 mg Oral Daily    thiamine  100 mg Oral Daily    multivitamin  1 tablet Oral Daily       Assessment & Plan:    Acute hypoxic respiratory failure d/t influenza A with acute bronchospasm   Tamiflu started 1/9  Add antitussives  Add nebs  Oxygen, wean as able, currently 2L  Consider steroids if without response to above  Incentive spirometry  Add flutter valve  Check labs and PCT   RT evaluation  Alcohol abuse disorder  Monitor for signs symptoms of withdrawal   Acute blood loss anemia sp PRBC due to GI bleed sp EGD/cscope 1/8 with large hiatal hernia  Falls d/t weakness d/t hypovolemia, CT head  unremarkable  Anxiety  Depression   Incidental T12/L1 compression deformity, asymptomatic and suspect chronic    DVT Px: start Lovenox     At this point Ms. Thompson is expected to be discharge to: Home    Plan of care discussed with patient and RN.    Hussein Dorado MD        Supplementary Documentation:   DVT Mechanical Prophylaxis:   SCDs,    DVT Pharmacologic Prophylaxis   Medication   None                Code Status: Not on file  Stafford: No urinary catheter in place  Stafford Duration (in days):   Central line:    KB: 1/11/2025

## 2025-01-12 LAB — POTASSIUM SERPL-SCNC: 3.5 MMOL/L (ref 3.5–5.1)

## 2025-01-12 PROCEDURE — 99232 SBSQ HOSP IP/OBS MODERATE 35: CPT | Performed by: HOSPITALIST

## 2025-01-12 RX ORDER — IPRATROPIUM BROMIDE AND ALBUTEROL SULFATE 2.5; .5 MG/3ML; MG/3ML
3 SOLUTION RESPIRATORY (INHALATION)
Status: DISCONTINUED | OUTPATIENT
Start: 2025-01-12 | End: 2025-01-15

## 2025-01-12 NOTE — PLAN OF CARE
Assumed care at 0730  A/O x 4  1L nasal cannnula. Non-productive cough, although patient reports sputum at times.  NSR on tele  VSS  Denies pain  R PIV c.d.I saline locked  Low fiber soft diet. Low appetite but eating.   Up SBA  Utilizing purewick for urgency  No BM - miralax given this AM  Non-cardiac EP no needs  Wears dentures and glasses  On scheduled duonebs  On tamiflu for + influenza A. Droplet isolation precautions maintained.  All needs met. Pt updated. Will continue with plan of care.    Problem: METABOLIC/FLUID AND ELECTROLYTES - ADULT  Goal: Electrolytes maintained within normal limits  Description: INTERVENTIONS:  - Monitor labs and rhythm and assess patient for signs and symptoms of electrolyte imbalances  - Administer electrolyte replacement as ordered  - Monitor response to electrolyte replacements, including rhythm and repeat lab results as appropriate  - Fluid restriction as ordered  - Instruct patient on fluid and nutrition restrictions as appropriate  Outcome: Progressing     Problem: HEMATOLOGIC - ADULT  Goal: Maintains hematologic stability  Description: INTERVENTIONS  - Assess for signs and symptoms of bleeding or hemorrhage  - Monitor labs and vital signs for trends  - Administer supportive blood products/factors, fluids and medications as ordered and appropriate  - Administer supportive blood products/factors as ordered and appropriate  Outcome: Progressing  Goal: Free from bleeding injury  Description: (Example usage: patient with low platelets)  INTERVENTIONS:  - Avoid intramuscular injections, enemas and rectal medication administration  - Ensure safe mobilization of patient  - Hold pressure on venipuncture sites to achieve adequate hemostasis  - Assess for signs and symptoms of internal bleeding  - Monitor lab trends  - Patient is to report abnormal signs of bleeding to staff  - Avoid use of toothpicks and dental floss  - Use electric shaver for shaving  - Use soft bristle tooth brush  -  Limit straining and forceful nose blowing  Outcome: Progressing     Problem: GASTROINTESTINAL - ADULT  Goal: Maintains or returns to baseline bowel function  Description: INTERVENTIONS:  - Assess bowel function  - Maintain adequate hydration with IV or PO as ordered and tolerated  - Evaluate effectiveness of GI medications  - Encourage mobilization and activity  - Obtain nutritional consult as needed  - Establish a toileting routine/schedule  - Consider collaborating with pharmacy to review patient's medication profile  Outcome: Progressing     Problem: PAIN - ADULT  Goal: Verbalizes/displays adequate comfort level or patient's stated pain goal  Description: INTERVENTIONS:  - Encourage pt to monitor pain and request assistance  - Assess pain using appropriate pain scale  - Administer analgesics based on type and severity of pain and evaluate response  - Implement non-pharmacological measures as appropriate and evaluate response  - Consider cultural and social influences on pain and pain management  - Manage/alleviate anxiety  - Utilize distraction and/or relaxation techniques  - Monitor for opioid side effects  - Notify MD/LIP if interventions unsuccessful or patient reports new pain  - Anticipate increased pain with activity and pre-medicate as appropriate  Outcome: Progressing     Problem: SAFETY ADULT - FALL  Goal: Free from fall injury  Description: INTERVENTIONS:  - Assess pt frequently for physical needs  - Identify cognitive and physical deficits and behaviors that affect risk of falls.  - [place] fall precautions as indicated by assessment.  - Educate pt/family on patient safety including physical limitations  - Instruct pt to call for assistance with activity based on assessment  - Modify environment to reduce risk of injury  - Provide assistive devices as appropriate  - Consider OT/PT consult to assist with strengthening/mobility  - Encourage toileting schedule  Outcome: Progressing     Problem:  DISCHARGE PLANNING  Goal: Discharge to home or other facility with appropriate resources  Description: INTERVENTIONS:  - Identify barriers to discharge w/pt and caregiver  - Include patient/family/discharge partner in discharge planning  - Arrange for needed discharge resources and transportation as appropriate  - Identify discharge learning needs (meds, wound care, etc)  - Arrange for interpreters to assist at discharge as needed  - Consider post-discharge preferences of patient/family/discharge partner  - Complete POLST form as appropriate  - Assess patient's ability to be responsible for managing their own health  - Refer to Case Management Department for coordinating discharge planning if the patient needs post-hospital services based on physician/LIP order or complex needs related to functional status, cognitive ability or social support system  Outcome: Progressing

## 2025-01-12 NOTE — PLAN OF CARE
Assumed patient care @1930  A&O x4, glasses & upper dentures @bedside  VSS, NSR on tele, HR 80 - 90's  Non-cardiac electrolyte replacement protocol  On 1LO2 NC, baseline RA  Q4 Duonebs given per respiratory  Regular, low fat high fiber diet, tolerating well  R forearm PIV, SL  Continent x2, occasional incontinence   Up standby assist w/ walker  PRN Xanax & Melatonin given per MAR for sleep  PRN Senna given per MAR for constipation  Fall precautions in place  Bed alarm on, in lowest position, call light within reach and all needs met at this time    2315: Attempted to wean to RA, dropping down to 88%, placed back on 1LO2 per NC    Problem: METABOLIC/FLUID AND ELECTROLYTES - ADULT  Goal: Electrolytes maintained within normal limits  Description: INTERVENTIONS:  - Monitor labs and rhythm and assess patient for signs and symptoms of electrolyte imbalances  - Administer electrolyte replacement as ordered  - Monitor response to electrolyte replacements, including rhythm and repeat lab results as appropriate  - Fluid restriction as ordered  - Instruct patient on fluid and nutrition restrictions as appropriate  Outcome: Progressing     Problem: HEMATOLOGIC - ADULT  Goal: Maintains hematologic stability  Description: INTERVENTIONS  - Assess for signs and symptoms of bleeding or hemorrhage  - Monitor labs and vital signs for trends  - Administer supportive blood products/factors, fluids and medications as ordered and appropriate  - Administer supportive blood products/factors as ordered and appropriate  Outcome: Progressing  Goal: Free from bleeding injury  Description: (Example usage: patient with low platelets)  INTERVENTIONS:  - Avoid intramuscular injections, enemas and rectal medication administration  - Ensure safe mobilization of patient  - Hold pressure on venipuncture sites to achieve adequate hemostasis  - Assess for signs and symptoms of internal bleeding  - Monitor lab trends  - Patient is to report abnormal signs  of bleeding to staff  - Avoid use of toothpicks and dental floss  - Use electric shaver for shaving  - Use soft bristle tooth brush  - Limit straining and forceful nose blowing  Outcome: Progressing     Problem: GASTROINTESTINAL - ADULT  Goal: Maintains or returns to baseline bowel function  Description: INTERVENTIONS:  - Assess bowel function  - Maintain adequate hydration with IV or PO as ordered and tolerated  - Evaluate effectiveness of GI medications  - Encourage mobilization and activity  - Obtain nutritional consult as needed  - Establish a toileting routine/schedule  - Consider collaborating with pharmacy to review patient's medication profile  Outcome: Progressing     Problem: PAIN - ADULT  Goal: Verbalizes/displays adequate comfort level or patient's stated pain goal  Description: INTERVENTIONS:  - Encourage pt to monitor pain and request assistance  - Assess pain using appropriate pain scale  - Administer analgesics based on type and severity of pain and evaluate response  - Implement non-pharmacological measures as appropriate and evaluate response  - Consider cultural and social influences on pain and pain management  - Manage/alleviate anxiety  - Utilize distraction and/or relaxation techniques  - Monitor for opioid side effects  - Notify MD/LIP if interventions unsuccessful or patient reports new pain  - Anticipate increased pain with activity and pre-medicate as appropriate  Outcome: Progressing     Problem: SAFETY ADULT - FALL  Goal: Free from fall injury  Description: INTERVENTIONS:  - Assess pt frequently for physical needs  - Identify cognitive and physical deficits and behaviors that affect risk of falls.  - [place] fall precautions as indicated by assessment.  - Educate pt/family on patient safety including physical limitations  - Instruct pt to call for assistance with activity based on assessment  - Modify environment to reduce risk of injury  - Provide assistive devices as appropriate  -  Consider OT/PT consult to assist with strengthening/mobility  - Encourage toileting schedule  Outcome: Progressing     Problem: DISCHARGE PLANNING  Goal: Discharge to home or other facility with appropriate resources  Description: INTERVENTIONS:  - Identify barriers to discharge w/pt and caregiver  - Include patient/family/discharge partner in discharge planning  - Arrange for needed discharge resources and transportation as appropriate  - Identify discharge learning needs (meds, wound care, etc)  - Arrange for interpreters to assist at discharge as needed  - Consider post-discharge preferences of patient/family/discharge partner  - Complete POLST form as appropriate  - Assess patient's ability to be responsible for managing their own health  - Refer to Case Management Department for coordinating discharge planning if the patient needs post-hospital services based on physician/LIP order or complex needs related to functional status, cognitive ability or social support system  Outcome: Progressing

## 2025-01-12 NOTE — PROGRESS NOTES
[facility]   part of [facility]     Hospitalist Progress Note     Georgia Thompson Patient Status:  Inpatient    1945 MRN [id number]   Location [facility] 3NE-A Attending Hussein Dorado MD   Hosp Day # 5 PCP CHAVO MCHUGH, FREDRICK     Chief Complaint: GIB, Flu    Subjective:   Patient stats breathing is improving. Remain son 1L via NC. Working with flutter, not incentive spirometer.     Current medications:   guaiFENesin ER  600 mg Oral BID    pantoprazole  40 mg Oral QAM AC    ipratropium-albuterol  3 mL Nebulization 6 times per day    enoxaparin  40 mg Subcutaneous Nightly    oseltamivir  75 mg Oral BID    escitalopram  20 mg Oral QAM    folic acid  1 mg Oral Daily    thiamine  100 mg Oral Daily    multivitamin  1 tablet Oral Daily       Objective:    Review of Systems:   10 point ROS completed and was negative, except for pertinent positive and negatives stated in subjective.    Vital signs:  Temp:  [98 °F (36.7 °C)-98.8 °F (37.1 °C)] 98 °F (36.7 °C)  Pulse:  [85-90] 87  Resp:  [16-18] 18  BP: ()/(48-63) 113/48  SpO2:  [88 %-96 %] 88 %  No data found.  Physical Exam:    General: No acute distress.   Respiratory: Increase aeration, diffuse exp wheeze  Cardiovascular: S1, S2. Regular   Abdomen: Soft, nontender, nondistended.  Positive bowel sounds.   Extremities: No edema.  Neuro: AAOx3    Diagnostic Data:    Labs:  Recent Labs   Lab 25  1046 25  1630 25  0748 25  1630 25  0802 25  1652 01/10/25  0356 01/10/25  0828 25  0748   WBC 5.8  --  6.9  --   --   --   --   --  4.1   HGB 7.4*   < > 8.6*   < > 7.7* 8.1* 8.1* 8.1* 7.8*   MCV 84.6  --  81.7  --   --   --   --   --  83.6   .0  --  254.0  --   --   --   --   --  204.0   INR 1.08  --   --   --   --   --   --   --   --     < > = values in this interval not displayed.       Recent Labs   Lab 25  1046 25  0748 25  0236 25  0748 25  1729 25  0801   * 111*   --  94  --   --    BUN 10 5*  --  5*  --   --    CREATSERUM 0.85 0.74  --  0.67  --   --    CA 9.5 8.8  --  8.3*  --   --    ALB 3.9  --   --  3.2  --   --     143  --  141  --   --    K 3.1* 3.2*   < > 2.8* 3.3* 3.5    108  --  105  --   --    CO2 27.0 26.0  --  34.0*  --   --    ALKPHO 60  --   --  44*  --   --    AST 23  --   --  18  --   --    ALT 10  --   --  <7*  --   --    BILT 0.4  --   --  0.3  --   --    TP 6.5  --   --  5.3*  --   --     < > = values in this interval not displayed.       Estimated Creatinine Clearance: 76 mL/min (based on SCr of 0.67 mg/dL).    Recent Labs   Lab 01/07/25  1046   PTP 13.8   INR 1.08            COVID-19 Lab Results    COVID-19  Lab Results   Component Value Date    COVID19 Not Detected 01/08/2025    COVID19 Not Detected 03/25/2024    COVID19 NOT DETECTED 09/29/2021       Pro-Calcitonin  Recent Labs   Lab 01/08/25  0748 01/11/25  0748   PCT 0.04 0.09*       Cardiac  Recent Labs   Lab 01/11/25  0748   PBNP 651*       Creatinine Kinase  No results for input(s): \"CK\" in the last 168 hours.    Inflammatory Markers  Recent Labs   Lab 01/07/25  1307   ERNST 9*       Recent Labs   Lab 01/07/25  1046   TROPHS <3       Imaging: Imaging data reviewed in Epic.    Medications:    guaiFENesin ER  600 mg Oral BID    pantoprazole  40 mg Oral QAM AC    ipratropium-albuterol  3 mL Nebulization 6 times per day    enoxaparin  40 mg Subcutaneous Nightly    oseltamivir  75 mg Oral BID    escitalopram  20 mg Oral QAM    folic acid  1 mg Oral Daily    thiamine  100 mg Oral Daily    multivitamin  1 tablet Oral Daily       Assessment & Plan:    Acute hypoxic respiratory failure d/t influenza A with acute bronchospasm   Tamiflu started 1/9  Antitussives  Nebs  Oxygen, wean as able, currently 1L   Consider steroids if without response to above   Incentive spirometry - encourage use  Flutter valve  RT evaluation  Alcohol abuse disorder  Monitor for signs symptoms of withdrawal   Acute blood  loss anemia sp PRBC due to GI bleed sp EGD/cscope 1/8 with large hiatal hernia  Falls d/t weakness d/t hypovolemia, CT head unremarkable  Anxiety  Depression   Incidental T12/L1 compression deformity, asymptomatic and suspect chronic    DVT Px:  Lovenox     Discahrge planning    Plan of care discussed with patient, RN and RT.    Hussein Dorado MD        Supplementary Documentation:   DVT Mechanical Prophylaxis:   SCDs,    DVT Pharmacologic Prophylaxis   Medication    enoxaparin (Lovenox) 40 MG/0.4ML SUBQ injection 40 mg                Code Status: Not on file  Stafford: No urinary catheter in place  [place] Duration (in days):   Central line:    KB: 1/11/2025

## 2025-01-13 PROCEDURE — 99232 SBSQ HOSP IP/OBS MODERATE 35: CPT | Performed by: HOSPITALIST

## 2025-01-13 NOTE — PROGRESS NOTES
Assumed care at 7:30 am  Denies pain.   Alert and oriented x4.   Still on 1L NC  All safety precautions in place. Will continue to monitor.

## 2025-01-13 NOTE — PROGRESS NOTES
[facility]   part of [facility]     Hospitalist Progress Note     Georgia Thompson Patient Status:  Inpatient    1945 MRN [id number]   Location [facility] 3NE-A Attending Hussein Dorado MD   Hosp Day # 6 PCP CHAVO MCHUGH, FREDRICK     Chief Complaint: GIB, Flu    Subjective:   Patient  states her breathing is improving. Weaned to room air at time of visit. Working with flutter > IS    Current medications:   ipratropium-albuterol  3 mL Nebulization Q6H WA    guaiFENesin ER  600 mg Oral BID    pantoprazole  40 mg Oral QAM AC    enoxaparin  40 mg Subcutaneous Nightly    oseltamivir  75 mg Oral BID    escitalopram  20 mg Oral QAM    folic acid  1 mg Oral Daily    thiamine  100 mg Oral Daily    multivitamin  1 tablet Oral Daily       Objective:    Review of Systems:   10 point ROS completed and was negative, except for pertinent positive and negatives stated in subjective.    Vital signs:  Temp:  [97.8 °F (36.6 °C)-98.7 °F (37.1 °C)] 98.6 °F (37 °C)  Pulse:  [80-91] 81  Resp:  [] 15  BP: (106-139)/(52-64) 106/60  SpO2:  [90 %-95 %] 94 %  No data found.  Physical Exam:    General: No acute distress.   Respiratory:  Increased air exchange, coarse breathing sounds, improving - minimal wheeze  Cardiovascular: S1, S2. Regular   Abdomen: Soft, nontender, nondistended.  Positive bowel sounds.   Extremities: No edema.  Neuro: AAOx3    Diagnostic Data:    Labs:  Recent Labs   Lab 25  1046 25  1630 25  0748 25  1630 25  0802 25  1652 01/10/25  0356 01/10/25  0828 25  0748   WBC 5.8  --  6.9  --   --   --   --   --  4.1   HGB 7.4*   < > 8.6*   < > 7.7* 8.1* 8.1* 8.1* 7.8*   MCV 84.6  --  81.7  --   --   --   --   --  83.6   .0  --  254.0  --   --   --   --   --  204.0   INR 1.08  --   --   --   --   --   --   --   --     < > = values in this interval not displayed.       Recent Labs   Lab 25  1046 25  0748 25  0236 25  0748 25  1586  01/12/25  0801   * 111*  --  94  --   --    BUN 10 5*  --  5*  --   --    CREATSERUM 0.85 0.74  --  0.67  --   --    CA 9.5 8.8  --  8.3*  --   --    ALB 3.9  --   --  3.2  --   --     143  --  141  --   --    K 3.1* 3.2*   < > 2.8* 3.3* 3.5    108  --  105  --   --    CO2 27.0 26.0  --  34.0*  --   --    ALKPHO 60  --   --  44*  --   --    AST 23  --   --  18  --   --    ALT 10  --   --  <7*  --   --    BILT 0.4  --   --  0.3  --   --    TP 6.5  --   --  5.3*  --   --     < > = values in this interval not displayed.       Estimated Creatinine Clearance: 76 mL/min (based on SCr of 0.67 mg/dL).    Recent Labs   Lab 01/07/25  1046   PTP 13.8   INR 1.08            COVID-19 Lab Results    COVID-19  Lab Results   Component Value Date    COVID19 Not Detected 01/08/2025    COVID19 Not Detected 03/25/2024    COVID19 NOT DETECTED 09/29/2021       Pro-Calcitonin  Recent Labs   Lab 01/08/25  0748 01/11/25  0748   PCT 0.04 0.09*       Cardiac  Recent Labs   Lab 01/11/25  0748   PBNP 651*       Creatinine Kinase  No results for input(s): \"CK\" in the last 168 hours.    Inflammatory Markers  Recent Labs   Lab 01/07/25  1307   ERNST 9*       Recent Labs   Lab 01/07/25  1046   TROPHS <3       Imaging: Imaging data reviewed in Epic.    Medications:    ipratropium-albuterol  3 mL Nebulization Q6H WA    guaiFENesin ER  600 mg Oral BID    pantoprazole  40 mg Oral QAM AC    enoxaparin  40 mg Subcutaneous Nightly    oseltamivir  75 mg Oral BID    escitalopram  20 mg Oral QAM    folic acid  1 mg Oral Daily    thiamine  100 mg Oral Daily    multivitamin  1 tablet Oral Daily       Assessment & Plan:    Acute hypoxic respiratory failure d/t influenza A with acute bronchospasm   Tamiflu started 1/9 - completes today   Antitussives  Nebs  Oxygen, wean as able, currently room air  Incentive spirometry  Flutter valve  RT evaluation  Alcohol abuse disorder  Monitor for signs symptoms of withdrawal   Acute blood loss anemia sp PRBC  due to GI bleed sp EGD/cscope 1/8 with large hiatal hernia  Falls d/t weakness d/t hypovolemia, CT head unremarkable  Anxiety  Depression   Incidental T12/L1 compression deformity, asymptomatic and suspect chronic    DVT Px:  Lovenox     Hope for home tomorrow.     Plan of care discussed with patient.     Hussein Dorado MD        Supplementary Documentation:   DVT Mechanical Prophylaxis:   SCDs,    DVT Pharmacologic Prophylaxis   Medication    enoxaparin (Lovenox) 40 MG/0.4ML SUBQ injection 40 mg                Code Status: Not on file  Stafford: External urinary catheter in place  Stafford Duration (in days):   Central line:    KB: 1/14/2025

## 2025-01-13 NOTE — CM/SW NOTE
Pt has pneumonia.  Weaning pt off 02 - pt still on 1L 02.  DC maybe on Tuesday if pt can wean off 02.  Pt will go home with [facility] .

## 2025-01-13 NOTE — PHYSICAL THERAPY NOTE
PHYSICAL THERAPY TREATMENT NOTE - INPATIENT    Room Number: 3615/3615-A     Session: 1     Number of Visits to Meet Established Goals: 3    Presenting Problem: GI hemorrhage  Co-Morbidities : Anxiety, panic attack, depression, alcohol use d/o, COVID-19    PHYSICAL THERAPY MEDICAL/SOCIAL HISTORY  History related to current admission: Patient is a 79 year old female admitted on 1/7/2025 from Home for Fall with head injury.  Pt diagnosed with GI hemorrhage.     HOME SITUATION  Type of Home: Condo  Home Layout: One level                     Lives With: Significant other (of 35 years)    Drives: No   Patient Regularly Uses: Reading glasses       Prior Level of Cloud: Pt lives at home with partner. Pt reports that she is IND with her ADLs and does not use an assisted device to ambulate.     PHYSICAL THERAPY ASSESSMENT   Patient demonstrates fair progress this session, goals  remain in progress.      Patient is requiring contact guard assist as a result of the following impairments: decreased endurance/aerobic capacity and decreased muscular endurance.     Patient continues to function near baseline with bed mobility, transfers, and gait.  Next session anticipate patient to progress bed mobility, transfers, and gait.  Physical Therapy will continue to follow patient for duration of hospitalization.    Patient continues to benefit from continued skilled PT services: at discharge to promote prior level of function.  Anticipate patient will return home with home health PT.    PLAN DURING HOSPITALIZATION  Nursing Mobility Recommendation : 1 Assist  PT Device Recommendation: Rolling walker  PT Treatment Plan: Bed mobility;Body mechanics;Gait training;Patient education;Strengthening;Stair training;Transfer training;Balance training  Frequency (Obs): 3-5x/week     CURRENT GOALS     Goal #1 Patient is able to demonstrate supine - sit EOB @ level: modified independent      Goal #2 Patient is able to demonstrate transfers  EOB to/from [facility] at assistance level: supervision      Goal #3 Patient is able to ambulate 150 feet with assist device: walker - rolling at assistance level: supervision      Goal #4     Goal #5     Goal #6     Goal Comments: Goals established on 1/9/2025 1/13/2025 all goals ongoing  SUBJECTIVE  \"I feel better'    OBJECTIVE  Precautions: Bed/chair alarm (o2 checks)    WEIGHT BEARING RESTRICTION     PAIN ASSESSMENT   Rating: Unable to rate  Location: Stomach  Management Techniques: Activity promotion;Repositioning    BALANCE                                                                                                                       Static Sitting: Fair +  Dynamic Sitting: Fair +           Static Standing: Fair  Dynamic Standing: Fair -    ACTIVITY TOLERANCE                         O2 WALK  Oxygen Therapy  SPO2% on Room Air at Rest: 95  SPO2% on Oxygen at Rest: 95  At rest oxygen flow (liters per minute): 1  SPO2% Ambulation on Room Air: 93    AM-PAC '6-Clicks' INPATIENT SHORT FORM - BASIC MOBILITY  How much difficulty does the patient currently have...  Patient Difficulty: Turning over in bed (including adjusting bedclothes, sheets and blankets)?: A Little   Patient Difficulty: Sitting down on and standing up from a chair with arms (e.g., wheelchair, bedside commode, etc.): A Little   Patient Difficulty: Moving from lying on back to sitting on the side of the bed?: A Little   How much help from another person does the patient currently need...   Help from Another: Moving to and from a bed to a chair (including a wheelchair)?: A Little   Help from Another: Need to walk in hospital room?: A Little   Help from Another: Climbing 3-5 steps with a railing?: A Little     AM-PAC Score:  Raw Score: 18   Approx Degree of Impairment: 46.58%   Standardized Score (AM-PAC Scale): 43.63   CMS Modifier (G-Code): CK    FUNCTIONAL ABILITY STATUS  Gait Assessment   Functional Mobility/Gait Assessment  Gait Assistance: Contact  guard assist  Distance (ft): 20,20  Assistive Device: Rolling walker  Pattern: Shuffle    Skilled Therapy Provided    Bed Mobility:  Rolling: NT   Supine<>Sit: Sup   Sit<>Supine: NT     Transfer Mobility:  Sit<>Stand: Sup   Stand<>Sit: Sup   Gait: CGA 20ft x 2 using RW    Therapist's Comments:  Pt performed O2 walk using the RW. Pt initiated O2 at 95% at RA during session pt was able to ambulate with O2 between 92-94%. Pt reported 5/10 on the RPE scale. Educated pt on energy conservation techniques to reduce risk of falls due to fatigue.         Patient End of Session: Up in chair;Needs met;Call light within reach;RN aware of session/findings;All patient questions and concerns addressed    PT Session Time: 23 minutes  Therapeutic Activity: 23 minutes

## 2025-01-13 NOTE — PLAN OF CARE
Assumed patient care @1930  A&O x4, glasses & upper dentures @bedside  VSS, NSR on tele, HR 80 - 90's  Non-cardiac electrolyte replacement protocol  On 1LO2 NC, baseline RA  Q6 Duonebs given per respiratory  Regular, low fat high fiber diet, tolerating well  R forearm PIV, SL  Continent x2, occasional incontinence, purewhick in place  Up standby assist w/ walker  PRN Xanax & Melatonin given per MAR for sleep  Fall precautions in place  Bed alarm on, in lowest position, call light within reach and all needs met at this time.    Problem: METABOLIC/FLUID AND ELECTROLYTES - ADULT  Goal: Electrolytes maintained within normal limits  Description: INTERVENTIONS:  - Monitor labs and rhythm and assess patient for signs and symptoms of electrolyte imbalances  - Administer electrolyte replacement as ordered  - Monitor response to electrolyte replacements, including rhythm and repeat lab results as appropriate  - Fluid restriction as ordered  - Instruct patient on fluid and nutrition restrictions as appropriate  Outcome: Progressing     Problem: HEMATOLOGIC - ADULT  Goal: Maintains hematologic stability  Description: INTERVENTIONS  - Assess for signs and symptoms of bleeding or hemorrhage  - Monitor labs and vital signs for trends  - Administer supportive blood products/factors, fluids and medications as ordered and appropriate  - Administer supportive blood products/factors as ordered and appropriate  Outcome: Progressing  Goal: Free from bleeding injury  Description: (Example usage: patient with low platelets)  INTERVENTIONS:  - Avoid intramuscular injections, enemas and rectal medication administration  - Ensure safe mobilization of patient  - Hold pressure on venipuncture sites to achieve adequate hemostasis  - Assess for signs and symptoms of internal bleeding  - Monitor lab trends  - Patient is to report abnormal signs of bleeding to staff  - Avoid use of toothpicks and dental floss  - Use electric shaver for shaving  -  Use soft bristle tooth brush  - Limit straining and forceful nose blowing  Outcome: Progressing     Problem: GASTROINTESTINAL - ADULT  Goal: Maintains or returns to baseline bowel function  Description: INTERVENTIONS:  - Assess bowel function  - Maintain adequate hydration with IV or PO as ordered and tolerated  - Evaluate effectiveness of GI medications  - Encourage mobilization and activity  - Obtain nutritional consult as needed  - Establish a toileting routine/schedule  - Consider collaborating with pharmacy to review patient's medication profile  Outcome: Progressing     Problem: PAIN - ADULT  Goal: Verbalizes/displays adequate comfort level or patient's stated pain goal  Description: INTERVENTIONS:  - Encourage pt to monitor pain and request assistance  - Assess pain using appropriate pain scale  - Administer analgesics based on type and severity of pain and evaluate response  - Implement non-pharmacological measures as appropriate and evaluate response  - Consider cultural and social influences on pain and pain management  - Manage/alleviate anxiety  - Utilize distraction and/or relaxation techniques  - Monitor for opioid side effects  - Notify MD/LIP if interventions unsuccessful or patient reports new pain  - Anticipate increased pain with activity and pre-medicate as appropriate  Outcome: Progressing     Problem: SAFETY ADULT - FALL  Goal: Free from fall injury  Description: INTERVENTIONS:  - Assess pt frequently for physical needs  - Identify cognitive and physical deficits and behaviors that affect risk of falls.  - [place]augh fall precautions as indicated by assessment.  - Educate pt/family on patient safety including physical limitations  - Instruct pt to call for assistance with activity based on assessment  - Modify environment to reduce risk of injury  - Provide assistive devices as appropriate  - Consider OT/PT consult to assist with strengthening/mobility  - Encourage toileting schedule  Outcome:  Progressing     Problem: DISCHARGE PLANNING  Goal: Discharge to home or other facility with appropriate resources  Description: INTERVENTIONS:  - Identify barriers to discharge w/pt and caregiver  - Include patient/family/discharge partner in discharge planning  - Arrange for needed discharge resources and transportation as appropriate  - Identify discharge learning needs (meds, wound care, etc)  - Arrange for interpreters to assist at discharge as needed  - Consider post-discharge preferences of patient/family/discharge partner  - Complete POLST form as appropriate  - Assess patient's ability to be responsible for managing their own health  - Refer to Case Management Department for coordinating discharge planning if the patient needs post-hospital services based on physician/LIP order or complex needs related to functional status, cognitive ability or social support system  Outcome: Progressing

## 2025-01-14 PROCEDURE — 99233 SBSQ HOSP IP/OBS HIGH 50: CPT | Performed by: HOSPITALIST

## 2025-01-14 RX ORDER — PREDNISONE 20 MG/1
40 TABLET ORAL
Status: DISCONTINUED | OUTPATIENT
Start: 2025-01-14 | End: 2025-01-15

## 2025-01-14 RX ORDER — IPRATROPIUM BROMIDE AND ALBUTEROL SULFATE 2.5; .5 MG/3ML; MG/3ML
SOLUTION RESPIRATORY (INHALATION)
Status: DISPENSED
Start: 2025-01-14 | End: 2025-01-15

## 2025-01-14 RX ORDER — SODIUM CHLORIDE FOR INHALATION 3 %
3 VIAL, NEBULIZER (ML) INHALATION
Status: DISCONTINUED | OUTPATIENT
Start: 2025-01-14 | End: 2025-01-15

## 2025-01-14 NOTE — PROGRESS NOTES
[facility]   part of [facility]     Hospitalist Progress Note     Georgia Thompson Patient Status:  Inpatient    1945 MRN [id number]   Location [facility] 3NE-A Attending Hussein Dorado MD   Hosp Day # 7 PCP CHAVO MCHUGH, FREDRICK     Chief Complaint: GIB, Flu    Subjective:   Patient does not feel as well today. Remain son 1L via NC.    Current medications:   sodium chloride  3 mL Nebulization 2 times daily    predniSONE  40 mg Oral Daily with breakfast    ipratropium-albuterol  3 mL Nebulization Q6H WA    guaiFENesin ER  600 mg Oral BID    pantoprazole  40 mg Oral QAM AC    enoxaparin  40 mg Subcutaneous Nightly    escitalopram  20 mg Oral QAM    folic acid  1 mg Oral Daily    thiamine  100 mg Oral Daily    multivitamin  1 tablet Oral Daily       Objective:    Review of Systems:   10 point ROS completed and was negative, except for pertinent positive and negatives stated in subjective.    Vital signs:  Temp:  [98.4 °F (36.9 °C)-99.1 °F (37.3 °C)] 98.4 °F (36.9 °C)  Pulse:  [74-82] 74  Resp:  [16-19] 19  BP: (119-135)/(54-73) 124/73  SpO2:  [88 %-93 %] 93 %  No data found.  Physical Exam:    General: No acute distress.   Respiratory:  Coarse with exp wheeze  Cardiovascular: S1, S2. Regular   Abdomen: Soft, nontender, nondistended.  Positive bowel sounds.   Extremities: No edema.  Neuro: AAOx3    Diagnostic Data:    Labs:  Recent Labs   Lab 25  0748 25  1630 25  0802 25  1652 01/10/25  0356 01/10/25  0828 25  0748   WBC 6.9  --   --   --   --   --  4.1   HGB 8.6*   < > 7.7* 8.1* 8.1* 8.1* 7.8*   MCV 81.7  --   --   --   --   --  83.6   .0  --   --   --   --   --  204.0    < > = values in this interval not displayed.       Recent Labs   Lab 25  0748 25  0236 25  0748 25  1729 25  0801   *  --  94  --   --    BUN 5*  --  5*  --   --    CREATSERUM 0.74  --  0.67  --   --    CA 8.8  --  8.3*  --   --    ALB  --   --  3.2  --   --       --  141  --   --    K 3.2*   < > 2.8* 3.3* 3.5     --  105  --   --    CO2 26.0  --  34.0*  --   --    ALKPHO  --   --  44*  --   --    AST  --   --  18  --   --    ALT  --   --  <7*  --   --    BILT  --   --  0.3  --   --    TP  --   --  5.3*  --   --     < > = values in this interval not displayed.       Estimated Creatinine Clearance: 76 mL/min (based on SCr of 0.67 mg/dL).    No results for input(s): \"PTP\", \"INR\" in the last 168 hours.           COVID-19 Lab Results    COVID-19  Lab Results   Component Value Date    COVID19 Not Detected 01/08/2025    COVID19 Not Detected 03/25/2024    COVID19 NOT DETECTED 09/29/2021       Pro-Calcitonin  Recent Labs   Lab 01/08/25  0748 01/11/25  0748   PCT 0.04 0.09*       Cardiac  Recent Labs   Lab 01/11/25  0748   PBNP 651*       Creatinine Kinase  No results for input(s): \"CK\" in the last 168 hours.    Inflammatory Markers  No results for input(s): \"CRP\", \"ERNST\", \"LDH\", \"DDIMER\" in the last 168 hours.      No results for input(s): \"TROP\", \"TROPHS\", \"CK\" in the last 168 hours.      Imaging: Imaging data reviewed in Epic.    Medications:    sodium chloride  3 mL Nebulization 2 times daily    predniSONE  40 mg Oral Daily with breakfast    ipratropium-albuterol  3 mL Nebulization Q6H WA    guaiFENesin ER  600 mg Oral BID    pantoprazole  40 mg Oral QAM AC    enoxaparin  40 mg Subcutaneous Nightly    escitalopram  20 mg Oral QAM    folic acid  1 mg Oral Daily    thiamine  100 mg Oral Daily    multivitamin  1 tablet Oral Daily       Assessment & Plan:    Acute hypoxic respiratory failure d/t influenza A with acute bronchospasm   Tamiflu started 1/9 - completed  Antitussives  Add Prednisone burst  Nebs  Add saline nebs  Oxygen, wean as able, currently 1L  Incentive spirometry  Flutter valve  RT evaluation  Alcohol abuse disorder  Monitor for signs symptoms of withdrawal   Acute blood loss anemia sp PRBC due to GI bleed sp EGD/cscope 1/8 with large hiatal  hernia  Falls d/t weakness d/t hypovolemia, CT head unremarkable  Anxiety  Depression   Incidental T12/L1 compression deformity, asymptomatic and suspect chronic    DVT Px:  Lovenox     Hope for home in next 24 hours once off oxygen.    Plan of care discussed with patient.     Hussein Dorado MD        Supplementary Documentation:   DVT Mechanical Prophylaxis:   SCDs,    DVT Pharmacologic Prophylaxis   Medication    enoxaparin (Lovenox) 40 MG/0.4ML SUBQ injection 40 mg                Code Status: Not on file  Stafford: External urinary catheter in place  Stafford Duration (in days):   Central line:    KB: 1/14/2025

## 2025-01-14 NOTE — PROGRESS NOTES
#[facility]   part of [facility]     Hospitalist Progress Note     Georgia Thompson Patient Status:  Inpatient    1945 MRN [id number]   [facility] 3NE-A Attending Hussein Dorado MD   Hosp Day # 7 PCP CHAVO MCHUGH, FREDRICK     Chief Complaint: I am wheezing    Subjective:     Patient cont to wheeze and get sob on minimal exertion.     Objective:    Review of Systems:   A comprehensive review of systems was completed; pertinent positive and negatives stated in subjective.    Vital signs:  Temp:  [98.4 °F (36.9 °C)-98.5 °F (36.9 °C)] 98.4 °F (36.9 °C)  Pulse:  [74-82] 74  Resp:  [16-19] 19  BP: (119-135)/(60-73) 124/73  SpO2:  [88 %-93 %] 93 %    Physical Exam:    General: No acute distress  Respiratory: positive wheezes, no rhonchi  Cardiovascular: S1, S2, regular rate and rhythm  Abdomen: Soft, Non-tender, non-distended, positive bowel sounds  Neuro: No new focal deficits.   Extremities: No edema      Diagnostic Data:    Labs:  Recent Labs   Lab 25  0748 25  1630 25  0802 25  1652 01/10/25  0356 01/10/25  0828 25  0748   WBC 6.9  --   --   --   --   --  4.1   HGB 8.6*   < > 7.7* 8.1* 8.1* 8.1* 7.8*   MCV 81.7  --   --   --   --   --  83.6   .0  --   --   --   --   --  204.0    < > = values in this interval not displayed.       Recent Labs   Lab 25  0748 25  0236 25  0748 25  1729 25  0801   *  --  94  --   --    BUN 5*  --  5*  --   --    CREATSERUM 0.74  --  0.67  --   --    CA 8.8  --  8.3*  --   --    ALB  --   --  3.2  --   --      --  141  --   --    K 3.2*   < > 2.8* 3.3* 3.5     --  105  --   --    CO2 26.0  --  34.0*  --   --    ALKPHO  --   --  44*  --   --    AST  --   --  18  --   --    ALT  --   --  <7*  --   --    BILT  --   --  0.3  --   --    TP  --   --  5.3*  --   --     < > = values in this interval not displayed.       Estimated Glomerular Filtration Rate: 90.2 mL/min/1.73m2 (by  CKD-EPI based on SCr of 0.67 mg/dL).    No results for input(s): \"TROP\", \"TROPHS\", \"CK\" in the last 168 hours.    No results for input(s): \"PTP\", \"INR\" in the last 168 hours.               Microbiology    Hospital Encounter on 01/07/25   1. Blood Culture     Status: None    Collection Time: 01/08/25  4:31 PM    Specimen: Blood,peripheral   Result Value Ref Range    Blood Culture Result No Growth 5 Days N/A         Imaging: Reviewed in Epic.    Medications:    sodium chloride  3 mL Nebulization 2 times daily    predniSONE  40 mg Oral Daily with breakfast    ipratropium-albuterol  3 mL Nebulization Q6H WA    guaiFENesin ER  600 mg Oral BID    pantoprazole  40 mg Oral QAM AC    enoxaparin  40 mg Subcutaneous Nightly    escitalopram  20 mg Oral QAM    folic acid  1 mg Oral Daily    thiamine  100 mg Oral Daily    multivitamin  1 tablet Oral Daily       Assessment & Plan:      #Acute hypoxemic respiratory failure  Oxygen needs improving  Incentive spirometer    #Influenza A  #Acute bronchospasm  S/p Tamiflu course  Escalate to Iv solumedrol  Prednisone (1/14-1/15)  PRN ANNE    #Alcohol abuse disorder    #Acute blood loss anemia  S/p EGD and colonoscopy on 1/8 which showed a large hiatal hernia    #Falls  #Weakness    #Anxiety  #Depression    #Incidental T12/L1 compression deformity  Asymptomatic      Timur Perdomo DO    Supplementary Documentation:     Quality:  DVT Mechanical Prophylaxis:   SCDs,    DVT Pharmacologic Prophylaxis   Medication    enoxaparin (Lovenox) 40 MG/0.4ML SUBQ injection 40 mg                Code Status: Not on file  Stafford: External urinary catheter in place  Stafford Duration (in days):   Central line:    KB: 1/14/2025    Discharge is dependent on: improvement in breathing  At this point Ms. Thompson is expected to be discharge to: home    The 21st Century Cures Act makes medical notes like these available to patients in the interest of transparency. Please be advised this is a medical document. Medical  documents are intended to carry relevant information, facts as evident, and the clinical opinion of the practitioner. The medical note is intended as peer to peer communication and may appear blunt or direct. It is written in medical language and may contain abbreviations or verbiage that are unfamiliar.

## 2025-01-14 NOTE — PLAN OF CARE
Pt alert and oriented x 4   1-2 L per NC   Pt up in chair noted O2 sat 88%-2L per NC  Non-Cardiac Electrolyte Protocol  Reg/Gen Diet  PRN Xanax for anxiety  Standby with walker  Lovenox VTE  Denies pain    Problem: HEMATOLOGIC - ADULT  Goal: Maintains hematologic stability  Description: INTERVENTIONS  - Assess for signs and symptoms of bleeding or hemorrhage  - Monitor labs and vital signs for trends  - Administer supportive blood products/factors, fluids and medications as ordered and appropriate  - Administer supportive blood products/factors as ordered and appropriate  Outcome: Progressing  Goal: Free from bleeding injury  Description: (Example usage: patient with low platelets)  INTERVENTIONS:  - Avoid intramuscular injections, enemas and rectal medication administration  - Ensure safe mobilization of patient  - Hold pressure on venipuncture sites to achieve adequate hemostasis  - Assess for signs and symptoms of internal bleeding  - Monitor lab trends  - Patient is to report abnormal signs of bleeding to staff  - Avoid use of toothpicks and dental floss  - Use electric shaver for shaving  - Use soft bristle tooth brush  - Limit straining and forceful nose blowing  Outcome: Progressing     Problem: GASTROINTESTINAL - ADULT  Goal: Maintains or returns to baseline bowel function  Description: INTERVENTIONS:  - Assess bowel function  - Maintain adequate hydration with IV or PO as ordered and tolerated  - Evaluate effectiveness of GI medications  - Encourage mobilization and activity  - Obtain nutritional consult as needed  - Establish a toileting routine/schedule  - Consider collaborating with pharmacy to review patient's medication profile  Outcome: Progressing     Problem: PAIN - ADULT  Goal: Verbalizes/displays adequate comfort level or patient's stated pain goal  Description: INTERVENTIONS:  - Encourage pt to monitor pain and request assistance  - Assess pain using appropriate pain scale  - Administer  analgesics based on type and severity of pain and evaluate response  - Implement non-pharmacological measures as appropriate and evaluate response  - Consider cultural and social influences on pain and pain management  - Manage/alleviate anxiety  - Utilize distraction and/or relaxation techniques  - Monitor for opioid side effects  - Notify MD/LIP if interventions unsuccessful or patient reports new pain  - Anticipate increased pain with activity and pre-medicate as appropriate  Outcome: Progressing

## 2025-01-14 NOTE — PLAN OF CARE
Assumed care at 1930. A/Ox4, on room air, NSR on tele. Lovenox for VTE. Non productive cough. Droplet isolation, +influenza. Regular diet. Ambulates with walker, voids. PIV saline locked. Patient updated with plan of care. All needs met at this time.   PRN xanax given per mar.     Problem: METABOLIC/FLUID AND ELECTROLYTES - ADULT  Goal: Electrolytes maintained within normal limits  Description: INTERVENTIONS:  - Monitor labs and rhythm and assess patient for signs and symptoms of electrolyte imbalances  - Administer electrolyte replacement as ordered  - Monitor response to electrolyte replacements, including rhythm and repeat lab results as appropriate  - Fluid restriction as ordered  - Instruct patient on fluid and nutrition restrictions as appropriate  Outcome: Progressing     Problem: HEMATOLOGIC - ADULT  Goal: Maintains hematologic stability  Description: INTERVENTIONS  - Assess for signs and symptoms of bleeding or hemorrhage  - Monitor labs and vital signs for trends  - Administer supportive blood products/factors, fluids and medications as ordered and appropriate  - Administer supportive blood products/factors as ordered and appropriate  Outcome: Progressing  Goal: Free from bleeding injury  Description: (Example usage: patient with low platelets)  INTERVENTIONS:  - Avoid intramuscular injections, enemas and rectal medication administration  - Ensure safe mobilization of patient  - Hold pressure on venipuncture sites to achieve adequate hemostasis  - Assess for signs and symptoms of internal bleeding  - Monitor lab trends  - Patient is to report abnormal signs of bleeding to staff  - Avoid use of toothpicks and dental floss  - Use electric shaver for shaving  - Use soft bristle tooth brush  - Limit straining and forceful nose blowing  Outcome: Progressing     Problem: PAIN - ADULT  Goal: Verbalizes/displays adequate comfort level or patient's stated pain goal  Description: INTERVENTIONS:  - Encourage pt to  Suzi Nelson is a 58 year old female here for  No chief complaint on file.    Denies latex allergy or sensitivity.    Medication verified, no changes.  PCP and Pharmacy verified.    Social History     Tobacco Use   Smoking Status Every Day    Current packs/day: 0.50    Average packs/day: 0.5 packs/day for 41.8 years (20.9 ttl pk-yrs)    Types: Cigarettes    Start date: 1/1/1983    Passive exposure: Past   Smokeless Tobacco Never   Tobacco Comments    Planning to start smoking cessation when treatment is complete; MDs aware     Advance Directives Filed: No    ECOG:   ECOG   ECOG Performance Status        Vitals:    Visit Vitals  LMP 12/22/2022 (Exact Date) Comment: hx of tubal ligation       These vital signs are:  Within defined parameters (Per Reference \"Defined Limits Hospital Outpatient Department (HOD)\")    Height: No.  Ht Readings from Last 1 Encounters:   10/11/24 5' 3\" (1.6 m)     Weight:Yes, shoes off.  Wt Readings from Last 3 Encounters:   10/11/24 49.9 kg (110 lb)   10/01/24 49.5 kg (109 lb 0.3 oz)   09/25/24 51.2 kg (112 lb 14.4 oz)       BMI: There is no height or weight on file to calculate BMI.    REVIEW OF SYSTEMS  GENERAL:  Patient denies headache, fevers, chills, night sweats, excessive fatigue, change in appetite, weight loss, dizziness  ALLERGIC/IMMUNOLOGIC: Verified allergies: Yes  EYES:  Patient denies significant visual difficulties, double vision, blurred vision  ENT/MOUTH: Patient denies problems with hearing, sore throat, sinus drainage, mouth sores  ENDOCRINE:  Patient denies diabetes, thyroid disease, hormone replacement, hot flashes  HEMATOLOGIC/LYMPHATIC: Patient denies easy bruising, bleeding, tender lymph nodes, swollen lymph nodes  BREASTS: Patient denies abnormal masses of breast, nipple discharge, pain  RESPIRATORY:  Patient denies lung pain with breathing, cough, coughing up blood, shortness of breath  CARDIOVASCULAR:  Patient denies anginal chest pain, palpitations, shortness  monitor pain and request assistance  - Assess pain using appropriate pain scale  - Administer analgesics based on type and severity of pain and evaluate response  - Implement non-pharmacological measures as appropriate and evaluate response  - Consider cultural and social influences on pain and pain management  - Manage/alleviate anxiety  - Utilize distraction and/or relaxation techniques  - Monitor for opioid side effects  - Notify MD/LIP if interventions unsuccessful or patient reports new pain  - Anticipate increased pain with activity and pre-medicate as appropriate  Outcome: Progressing     Problem: SAFETY ADULT - FALL  Goal: Free from fall injury  Description: INTERVENTIONS:  - Assess pt frequently for physical needs  - Identify cognitive and physical deficits and behaviors that affect risk of falls.  - [place] fall precautions as indicated by assessment.  - Educate pt/family on patient safety including physical limitations  - Instruct pt to call for assistance with activity based on assessment  - Modify environment to reduce risk of injury  - Provide assistive devices as appropriate  - Consider OT/PT consult to assist with strengthening/mobility  - Encourage toileting schedule  Outcome: Progressing     Problem: DISCHARGE PLANNING  Goal: Discharge to home or other facility with appropriate resources  Description: INTERVENTIONS:  - Identify barriers to discharge w/pt and caregiver  - Include patient/family/discharge partner in discharge planning  - Arrange for needed discharge resources and transportation as appropriate  - Identify discharge learning needs (meds, wound care, etc)  - Arrange for interpreters to assist at discharge as needed  - Consider post-discharge preferences of patient/family/discharge partner  - Complete POLST form as appropriate  - Assess patient's ability to be responsible for managing their own health  - Refer to Case Management Department for coordinating discharge planning if the  of breath when lying flat, peripheral edema  GASTROINTESTINAL: Patient denies abdominal pain , nausea, vomiting, diarrhea, GI bleeding, constipation, change in bowel habits, heartburn, sensation of feeling full, difficulty swallowing  : Patient denies abnormal genital masses, blood in the urine, frequency, urgency, burning with urination, hesitancy, incontinence, vaginal bleeding, discharge  MUSCULOSKELETAL:  Patient denies joint pain, bone pain, joint swelling, redness, decreased range of motion  SKIN:  Patient denies chronic rashes, inflammation, ulcerations, skin changes, itching  NEUROLOGIC:  Patient denies loss of balance, areas of focal weakness, abnormal gait, sensory problems, numbness, tingling  PSYCHIATRIC: Patient denies insomnia, depression, anxiety    This patient reported abnormal symptoms that needed immediate verbal communication: No     patient needs post-hospital services based on physician/LIP order or complex needs related to functional status, cognitive ability or social support system  Outcome: Progressing     Problem: GASTROINTESTINAL - ADULT  Goal: Maintains or returns to baseline bowel function  Description: INTERVENTIONS:  - Assess bowel function  - Maintain adequate hydration with IV or PO as ordered and tolerated  - Evaluate effectiveness of GI medications  - Encourage mobilization and activity  - Obtain nutritional consult as needed  - Establish a toileting routine/schedule  - Consider collaborating with pharmacy to review patient's medication profile  Outcome: Progressing

## 2025-01-14 NOTE — DIETARY NOTE
[facility]   part of [facility]   CLINICAL NUTRITION    Georgia Thompson     Admitting diagnosis:  Blunt head trauma, initial encounter [S09.8XXA]  Gastrointestinal hemorrhage, unspecified gastrointestinal hemorrhage type [K92.2]  Anemia, unspecified type [D64.9]    Ht: 177.8 cm (5' 10\")  Wt: 73.9 kg (163 lb).   Body mass index is 23.39 kg/m².  IBW: 68.18 kg    Wt Readings from Last 6 Encounters:   01/07/25 73.9 kg (163 lb)   03/25/24 68 kg (150 lb)   01/24/21 66 kg (145 lb 9.6 oz)   01/19/16 63.5 kg (140 lb)        Labs/Meds reviewed    Diet:       Procedures    Regular/General diet Is Patient on Accuchecks? No     Percent Meals Eaten (last 3 days)       Date/Time Percent Meals Eaten (%)    01/13/25 1919 100 %              Pt chart reviewed d/t LOS. Initially came in for a colonoscopy to examine GIB. Pt has poor appetite but is motivated to eat and reports eating her meals until they're gone. Pt reports eating twice a day at home and does not like to snack. Pt eats healthy at home, no meat, and makes protein smoothies with fruit and yogurt. Supplements taken at home include a multivitamin, D3 & Zinc, and protein powder, with occasional metamucil. Pt has not had any recent weight loss but has instead gained weight recently and is not happy about it. Overall she seems in good spirits, very talkative and motivated to continue eating in order to gain her strength and energy back. She is drinking coffee with breakfast and has agreed to try a chocolate Parkzzz shake as well as her meals.   Patient reports fair appetite at this time.  Nursing notes reports Percent Meals Eaten (%): 100 % intake for last meal.  Tolerating po diet without diarrhea, emesis, or constipation.   No significant weight changes noted.     Patient is at low nutrition risk at this time.    Please consult if patient status changes or nutrition issues arise.    Danica Proctor  Dietetic Intern

## 2025-01-15 PROCEDURE — 99232 SBSQ HOSP IP/OBS MODERATE 35: CPT | Performed by: HOSPITALIST

## 2025-01-15 PROCEDURE — 99497 ADVNCD CARE PLAN 30 MIN: CPT | Performed by: HOSPITALIST

## 2025-01-15 RX ORDER — IPRATROPIUM BROMIDE AND ALBUTEROL SULFATE 2.5; .5 MG/3ML; MG/3ML
3 SOLUTION RESPIRATORY (INHALATION) EVERY 6 HOURS PRN
Status: DISCONTINUED | OUTPATIENT
Start: 2025-01-15 | End: 2025-01-17

## 2025-01-15 RX ORDER — METHYLPREDNISOLONE SODIUM SUCCINATE 40 MG/ML
40 INJECTION INTRAMUSCULAR; INTRAVENOUS EVERY 8 HOURS
Status: DISCONTINUED | OUTPATIENT
Start: 2025-01-15 | End: 2025-01-17

## 2025-01-15 RX ORDER — SODIUM CHLORIDE FOR INHALATION 3 %
3 VIAL, NEBULIZER (ML) INHALATION AS NEEDED
Status: DISCONTINUED | OUTPATIENT
Start: 2025-01-15 | End: 2025-01-17

## 2025-01-15 NOTE — PLAN OF CARE
A/o x4. Oxygen saturation 90-91% on 1 LNC. Encouraged IS/ Flutter valve.  Denies pain. Able to stand, march with a walker, standby assist. Bed alarm on. Afebrile overnight.   0626- Minimal dyspnea on exertion when ambulating to the bathroom. Weaned O2 to room air. O2 sat 90-91% room air.  Problem: PAIN - ADULT  Goal: Verbalizes/displays adequate comfort level or patient's stated pain goal  Description: INTERVENTIONS:  - Encourage pt to monitor pain and request assistance  - Assess pain using appropriate pain scale  - Administer analgesics based on type and severity of pain and evaluate response  - Implement non-pharmacological measures as appropriate and evaluate response  - Consider cultural and social influences on pain and pain management  - Manage/alleviate anxiety  - Utilize distraction and/or relaxation techniques  - Monitor for opioid side effects  - Notify MD/LIP if interventions unsuccessful or patient reports new pain  - Anticipate increased pain with activity and pre-medicate as appropriate  Outcome: Progressing

## 2025-01-15 NOTE — PLAN OF CARE
Assumed patient care at 0730. Alert and oriented x4. Room air. Normal sinus on tele. Non cardiac electrolyte protocol. Continent x2. Regular diet. Patient denies any pain at this time. Up with a walker and one assist. Droplet precautions are in place. Right ac iv saline locked. All safety precautions are in place.            Problem: METABOLIC/FLUID AND ELECTROLYTES - ADULT  Goal: Electrolytes maintained within normal limits  Description: INTERVENTIONS:  - Monitor labs and rhythm and assess patient for signs and symptoms of electrolyte imbalances  - Administer electrolyte replacement as ordered  - Monitor response to electrolyte replacements, including rhythm and repeat lab results as appropriate  - Fluid restriction as ordered  - Instruct patient on fluid and nutrition restrictions as appropriate  Outcome: Progressing     Problem: HEMATOLOGIC - ADULT  Goal: Maintains hematologic stability  Description: INTERVENTIONS  - Assess for signs and symptoms of bleeding or hemorrhage  - Monitor labs and vital signs for trends  - Administer supportive blood products/factors, fluids and medications as ordered and appropriate  - Administer supportive blood products/factors as ordered and appropriate  Outcome: Progressing  Goal: Free from bleeding injury  Description: (Example usage: patient with low platelets)  INTERVENTIONS:  - Avoid intramuscular injections, enemas and rectal medication administration  - Ensure safe mobilization of patient  - Hold pressure on venipuncture sites to achieve adequate hemostasis  - Assess for signs and symptoms of internal bleeding  - Monitor lab trends  - Patient is to report abnormal signs of bleeding to staff  - Avoid use of toothpicks and dental floss  - Use electric shaver for shaving  - Use soft bristle tooth brush  - Limit straining and forceful nose blowing  Outcome: Progressing     Problem: PAIN - ADULT  Goal: Verbalizes/displays adequate comfort level or patient's stated pain  goal  Description: INTERVENTIONS:  - Encourage pt to monitor pain and request assistance  - Assess pain using appropriate pain scale  - Administer analgesics based on type and severity of pain and evaluate response  - Implement non-pharmacological measures as appropriate and evaluate response  - Consider cultural and social influences on pain and pain management  - Manage/alleviate anxiety  - Utilize distraction and/or relaxation techniques  - Monitor for opioid side effects  - Notify MD/LIP if interventions unsuccessful or patient reports new pain  - Anticipate increased pain with activity and pre-medicate as appropriate  Outcome: Progressing     Problem: SAFETY ADULT - FALL  Goal: Free from fall injury  Description: INTERVENTIONS:  - Assess pt frequently for physical needs  - Identify cognitive and physical deficits and behaviors that affect risk of falls.  - [place] fall precautions as indicated by assessment.  - Educate pt/family on patient safety including physical limitations  - Instruct pt to call for assistance with activity based on assessment  - Modify environment to reduce risk of injury  - Provide assistive devices as appropriate  - Consider OT/PT consult to assist with strengthening/mobility  - Encourage toileting schedule  Outcome: Progressing     Problem: DISCHARGE PLANNING  Goal: Discharge to home or other facility with appropriate resources  Description: INTERVENTIONS:  - Identify barriers to discharge w/pt and caregiver  - Include patient/family/discharge partner in discharge planning  - Arrange for needed discharge resources and transportation as appropriate  - Identify discharge learning needs (meds, wound care, etc)  - Arrange for interpreters to assist at discharge as needed  - Consider post-discharge preferences of patient/family/discharge partner  - Complete POLST form as appropriate  - Assess patient's ability to be responsible for managing their own health  - Refer to Case Management  Department for coordinating discharge planning if the patient needs post-hospital services based on physician/LIP order or complex needs related to functional status, cognitive ability or social support system  Outcome: Progressing     Problem: GASTROINTESTINAL - ADULT  Goal: Maintains or returns to baseline bowel function  Description: INTERVENTIONS:  - Assess bowel function  - Maintain adequate hydration with IV or PO as ordered and tolerated  - Evaluate effectiveness of GI medications  - Encourage mobilization and activity  - Obtain nutritional consult as needed  - Establish a toileting routine/schedule  - Consider collaborating with pharmacy to review patient's medication profile  Outcome: Progressing

## 2025-01-15 NOTE — CM/SW NOTE
Updates sent to [facility]- await clearance for dc.    ADAMA RussellW  /Discharge Planner

## 2025-01-16 LAB
ALBUMIN SERPL-MCNC: 3.5 G/DL (ref 3.2–4.8)
ALBUMIN/GLOB SERPL: 1.3 {RATIO} (ref 1–2)
ALP LIVER SERPL-CCNC: 50 U/L
ALT SERPL-CCNC: 11 U/L
ANION GAP SERPL CALC-SCNC: 7 MMOL/L (ref 0–18)
AST SERPL-CCNC: 22 U/L (ref ?–34)
BASOPHILS # BLD AUTO: 0.01 X10(3) UL (ref 0–0.2)
BASOPHILS NFR BLD AUTO: 0.1 %
BILIRUB SERPL-MCNC: 0.3 MG/DL (ref 0.2–1.1)
BUN BLD-MCNC: 13 MG/DL (ref 9–23)
CALCIUM BLD-MCNC: 9.1 MG/DL (ref 8.7–10.6)
CHLORIDE SERPL-SCNC: 103 MMOL/L (ref 98–112)
CO2 SERPL-SCNC: 29 MMOL/L (ref 21–32)
CREAT BLD-MCNC: 0.6 MG/DL
EGFRCR SERPLBLD CKD-EPI 2021: 92 ML/MIN/1.73M2 (ref 60–?)
EOSINOPHIL # BLD AUTO: 0 X10(3) UL (ref 0–0.7)
EOSINOPHIL NFR BLD AUTO: 0 %
ERYTHROCYTE [DISTWIDTH] IN BLOOD BY AUTOMATED COUNT: 17 %
GLOBULIN PLAS-MCNC: 2.6 G/DL (ref 2–3.5)
GLUCOSE BLD-MCNC: 140 MG/DL (ref 70–99)
HCT VFR BLD AUTO: 27.3 %
HGB BLD-MCNC: 8.6 G/DL
IMM GRANULOCYTES # BLD AUTO: 0.09 X10(3) UL (ref 0–1)
IMM GRANULOCYTES NFR BLD: 0.8 %
LYMPHOCYTES # BLD AUTO: 0.5 X10(3) UL (ref 1–4)
LYMPHOCYTES NFR BLD AUTO: 4.7 %
MAGNESIUM SERPL-MCNC: 1.7 MG/DL (ref 1.6–2.6)
MCH RBC QN AUTO: 26.5 PG (ref 26–34)
MCHC RBC AUTO-ENTMCNC: 31.5 G/DL (ref 31–37)
MCV RBC AUTO: 84 FL
MONOCYTES # BLD AUTO: 0.22 X10(3) UL (ref 0.1–1)
MONOCYTES NFR BLD AUTO: 2.1 %
NEUTROPHILS # BLD AUTO: 9.91 X10 (3) UL (ref 1.5–7.7)
NEUTROPHILS # BLD AUTO: 9.91 X10(3) UL (ref 1.5–7.7)
NEUTROPHILS NFR BLD AUTO: 92.3 %
OSMOLALITY SERPL CALC.SUM OF ELEC: 290 MOSM/KG (ref 275–295)
PLATELET # BLD AUTO: 275 10(3)UL (ref 150–450)
POTASSIUM SERPL-SCNC: 4.2 MMOL/L (ref 3.5–5.1)
PROT SERPL-MCNC: 6.1 G/DL (ref 5.7–8.2)
RBC # BLD AUTO: 3.25 X10(6)UL
SODIUM SERPL-SCNC: 139 MMOL/L (ref 136–145)
WBC # BLD AUTO: 10.7 X10(3) UL (ref 4–11)

## 2025-01-16 PROCEDURE — 99232 SBSQ HOSP IP/OBS MODERATE 35: CPT | Performed by: HOSPITALIST

## 2025-01-16 RX ORDER — PREDNISONE 5 MG/1
TABLET ORAL
Qty: 45 TABLET | Refills: 0 | Status: SHIPPED | OUTPATIENT
Start: 2025-01-16 | End: 2025-01-17

## 2025-01-16 RX ORDER — BISMUTH SUBSALICYLATE 262 MG/1
1 TABLET, CHEWABLE ORAL EVERY 30 MIN PRN
Status: DISCONTINUED | OUTPATIENT
Start: 2025-01-16 | End: 2025-01-17

## 2025-01-16 RX ORDER — FAMOTIDINE 20 MG/1
20 TABLET, FILM COATED ORAL 2 TIMES DAILY
Status: DISCONTINUED | OUTPATIENT
Start: 2025-01-16 | End: 2025-01-17

## 2025-01-16 NOTE — PLAN OF CARE
Assumed patient care at 0730. Alert and oriented x4. Room air. Normal sinus on tele. Noncardiac electrolyte protocol. Regular diet. Continent. Accu Check QID. Patient denies any pain at this time. Stand by assist with a walker. Pills taken whole with water. Right AC saline locked. Droplet precautions are in place. All safety precautions are in place.       Problem: METABOLIC/FLUID AND ELECTROLYTES - ADULT  Goal: Electrolytes maintained within normal limits  Description: INTERVENTIONS:  - Monitor labs and rhythm and assess patient for signs and symptoms of electrolyte imbalances  - Administer electrolyte replacement as ordered  - Monitor response to electrolyte replacements, including rhythm and repeat lab results as appropriate  - Fluid restriction as ordered  - Instruct patient on fluid and nutrition restrictions as appropriate  Outcome: Progressing     Problem: HEMATOLOGIC - ADULT  Goal: Maintains hematologic stability  Description: INTERVENTIONS  - Assess for signs and symptoms of bleeding or hemorrhage  - Monitor labs and vital signs for trends  - Administer supportive blood products/factors, fluids and medications as ordered and appropriate  - Administer supportive blood products/factors as ordered and appropriate  Outcome: Progressing  Goal: Free from bleeding injury  Description: (Example usage: patient with low platelets)  INTERVENTIONS:  - Avoid intramuscular injections, enemas and rectal medication administration  - Ensure safe mobilization of patient  - Hold pressure on venipuncture sites to achieve adequate hemostasis  - Assess for signs and symptoms of internal bleeding  - Monitor lab trends  - Patient is to report abnormal signs of bleeding to staff  - Avoid use of toothpicks and dental floss  - Use electric shaver for shaving  - Use soft bristle tooth brush  - Limit straining and forceful nose blowing  Outcome: Progressing     Problem: PAIN - ADULT  Goal: Verbalizes/displays adequate comfort level or  patient's stated pain goal  Description: INTERVENTIONS:  - Encourage pt to monitor pain and request assistance  - Assess pain using appropriate pain scale  - Administer analgesics based on type and severity of pain and evaluate response  - Implement non-pharmacological measures as appropriate and evaluate response  - Consider cultural and social influences on pain and pain management  - Manage/alleviate anxiety  - Utilize distraction and/or relaxation techniques  - Monitor for opioid side effects  - Notify MD/LIP if interventions unsuccessful or patient reports new pain  - Anticipate increased pain with activity and pre-medicate as appropriate  Outcome: Progressing     Problem: SAFETY ADULT - FALL  Goal: Free from fall injury  Description: INTERVENTIONS:  - Assess pt frequently for physical needs  - Identify cognitive and physical deficits and behaviors that affect risk of falls.  - [place] fall precautions as indicated by assessment.  - Educate pt/family on patient safety including physical limitations  - Instruct pt to call for assistance with activity based on assessment  - Modify environment to reduce risk of injury  - Provide assistive devices as appropriate  - Consider OT/PT consult to assist with strengthening/mobility  - Encourage toileting schedule  Outcome: Progressing     Problem: DISCHARGE PLANNING  Goal: Discharge to home or other facility with appropriate resources  Description: INTERVENTIONS:  - Identify barriers to discharge w/pt and caregiver  - Include patient/family/discharge partner in discharge planning  - Arrange for needed discharge resources and transportation as appropriate  - Identify discharge learning needs (meds, wound care, etc)  - Arrange for interpreters to assist at discharge as needed  - Consider post-discharge preferences of patient/family/discharge partner  - Complete POLST form as appropriate  - Assess patient's ability to be responsible for managing their own health  - Refer  to Case Management Department for coordinating discharge planning if the patient needs post-hospital services based on physician/LIP order or complex needs related to functional status, cognitive ability or social support system  Outcome: Progressing     Problem: GASTROINTESTINAL - ADULT  Goal: Maintains or returns to baseline bowel function  Description: INTERVENTIONS:  - Assess bowel function  - Maintain adequate hydration with IV or PO as ordered and tolerated  - Evaluate effectiveness of GI medications  - Encourage mobilization and activity  - Obtain nutritional consult as needed  - Establish a toileting routine/schedule  - Consider collaborating with pharmacy to review patient's medication profile  Outcome: Progressing

## 2025-01-16 NOTE — PROGRESS NOTES
#Acute [facility]   part of [facility]     Hospitalist Progress Note     Georgia Thompson Patient Status:  Inpatient    1945 MRN [id number]   Location [facility] 3NE-A Attending Hussein Dorado MD   Hosp Day # 9 PCP CHAVO MCHUGH, FREDRICK     Chief Complaint: I am wheezing    Subjective:     Wheezing has improved. No sob/cp on exertion. Pt does cont to cough.     Pt uneasy and doesn't feel ready to go home    Objective:    Review of Systems:   A comprehensive review of systems was completed; pertinent positive and negatives stated in subjective.    Vital signs:  Temp:  [98 °F (36.7 °C)-98.6 °F (37 °C)] 98.4 °F (36.9 °C)  Pulse:  [73-95] 73  Resp:  [16-18] 18  BP: (137-154)/(70-88) 154/84  SpO2:  [90 %-95 %] 92 %    Physical Exam:    General: No acute distress  Respiratory: positive wheezes, no rhonchi  Cardiovascular: S1, S2, regular rate and rhythm  Abdomen: Soft, Non-tender, non-distended, positive bowel sounds  Neuro: No new focal deficits.   Extremities: No edema      Diagnostic Data:    Labs:  Recent Labs   Lab 25  1652 01/10/25  0356 01/10/25  0828 25  0748   WBC  --   --   --  4.1   HGB 8.1* 8.1* 8.1* 7.8*   MCV  --   --   --  83.6   PLT  --   --   --  204.0       Recent Labs   Lab 25  0748 25  1729 25  0801 25  0751   GLU 94  --   --  140*   BUN 5*  --   --  13   CREATSERUM 0.67  --   --  0.60   CA 8.3*  --   --  9.1   ALB 3.2  --   --  3.5     --   --  139   K 2.8* 3.3* 3.5 4.2     --   --  103   CO2 34.0*  --   --  29.0   ALKPHO 44*  --   --  50*   AST 18  --   --  22   ALT <7*  --   --  11   BILT 0.3  --   --  0.3   TP 5.3*  --   --  6.1       Estimated Glomerular Filtration Rate: 92.6 mL/min/1.73m2 (by CKD-EPI based on SCr of 0.6 mg/dL).    No results for input(s): \"TROP\", \"TROPHS\", \"CK\" in the last 168 hours.    No results for input(s): \"PTP\", \"INR\" in the last 168 hours.               Microbiology    Hospital Encounter on 25   1. Blood  Culture     Status: None    Collection Time: 01/08/25  4:31 PM    Specimen: Blood,peripheral   Result Value Ref Range    Blood Culture Result No Growth 5 Days N/A         Imaging: Reviewed in Epic.    Medications:    methylPREDNISolone  40 mg Intravenous Q8H    guaiFENesin ER  600 mg Oral BID    pantoprazole  40 mg Oral QAM AC    enoxaparin  40 mg Subcutaneous Nightly    escitalopram  20 mg Oral QAM    folic acid  1 mg Oral Daily    thiamine  100 mg Oral Daily    multivitamin  1 tablet Oral Daily       Assessment & Plan:      #Dispo  -hopefully tomorrow, cont iv steroids for now, pt cont to wheeze and have severe cough    #Acute hypoxemic respiratory failure  O2 needs cont to improve, will do formal home o2 eval prior to dc  Incentive spirometer    #Influenza A  #Acute bronchospasm  S/p Tamiflu course  cont Iv solumedrol for now; hopefully transition to po prednisone tomorrow  Prednisone on dc  PRN ANNE    #Alcohol abuse disorder    #Acute blood loss anemia  S/p EGD and colonoscopy on 1/8 which showed a large hiatal hernia    #Falls  #Weakness    #Anxiety  #Depression    #Incidental T12/L1 compression deformity  Asymptomatic      Timur Perdomo,     Supplementary Documentation:     Quality:  DVT Mechanical Prophylaxis:   SCDs,    DVT Pharmacologic Prophylaxis   Medication    enoxaparin (Lovenox) 40 MG/0.4ML SUBQ injection 40 mg                Code Status: DNAR/Selective Treatment  Stafford: External urinary catheter in place  Stafford Duration (in days):   Central line:    KB: 1/14/2025    Discharge is dependent on: improvement in breathing  At this point Ms. Thompson is expected to be discharge to: home    The 21st Century Cures Act makes medical notes like these available to patients in the interest of transparency. Please be advised this is a medical document. Medical documents are intended to carry relevant information, facts as evident, and the clinical opinion of the practitioner. The medical note is intended as peer  to peer communication and may appear blunt or direct. It is written in medical language and may contain abbreviations or verbiage that are unfamiliar.

## 2025-01-16 NOTE — OCCUPATIONAL THERAPY NOTE
IP OT attempted. Pt declining OOB activity at this time. Pt requesting to rest more and for this writer to come back in the later morning/early afternoon. OT continue to follow.

## 2025-01-16 NOTE — PLAN OF CARE
Received pt alert and oriented x4. VSS on RA.  Afebrile. Denies SOB. No complaints of pain. All medications given per MAR. Safety precautions in place. Call light within reach.     88%-90% on room air while sleeping. Placed on 1L O2, satting 92%.

## 2025-01-16 NOTE — OCCUPATIONAL THERAPY NOTE
OCCUPATIONAL THERAPY TREATMENT NOTE - INPATIENT     Room Number: 3615/3615-A  Session: 1   Number of Visits to Meet Established Goals: 7    Presenting Problem: Fall, headache, black stools; +Influenza, anemia, GIB, large hiatal hernia    HOME SITUATION  Type of Home: Condo  Home Layout: One level  Lives With: Significant other (of 35 years)     Toilet and Equipment: Standard height toilet  Shower/Tub and Equipment: Walk-in shower;Grab bar  Other Equipment:  (walker but does not use)     Occupation/Status: retired  Hand Dominance: Right  Drives: No  Patient Regularly Uses: Reading glasses     Prior Level of Function:  Independent with mobility and self care. Lives in a one level condo with significant other who just had 2 CVA and is receiving HHPT. Patient reported that he is able to complete his own ADLs. Patient manages her meds and his as well    ASSESSMENT   Patient demonstrates excellent progress this session, goals updated to reflect patient performance.    Patient functions near baseline with  ADLs and functional transfers .   Occupational Therapy will discharge patient at this time as all goals have been met at supervision/mod I.    Patient would benefit from home at discharge.    History: Patient is a 79 year old female admitted on 1/7/2025 with Presenting Problem: Fall, headache, black stools; +Influenza, anemia, GIB, large hiatal hernia. Co-Morbidities : Anxiety, panic attack, depression, alcohol use d/o, COVID-19    WEIGHT BEARING RESTRICTION       Recommendations for nursing staff:   Transfers: 1 person RW  Toileting location: toilet    TREATMENT SESSION:  Patient Start of Session: semi supine in bed    FUNCTIONAL TRANSFER ASSESSMENT  Sit to Stand: Edge of Bed; Chair  Edge of Bed: Supervision  Chair: Supervision  Toilet Transfer: Supervision    BED MOBILITY  Rolling: Modified Independent  Supine to Sit : Modified Independent  Sit to Supine (OT): Not Tested  Scooting: Mod I    BALANCE ASSESSMENT  Static  Sitting: Independent  Static Standing: Supervision    FUNCTIONAL ADL ASSESSMENT  Eating: Not Tested  Grooming Seated: Modified Independent  Grooming Standing: Supervision  LB Dressing Seated: Modified Independent  LB Dressing Standing: Supervision  Toileting Seated: Modified Independent  Toileting Standing: Supervision    ACTIVITY TOLERANCE: WFL                         O2 SATURATIONS       EDUCATION PROVIDED  Patient Education : Role of Occupational Therapy; Plan of Care; Discharge Recommendations; Functional Transfer Techniques; Fall Prevention; Posture/Positioning; Energy Conservation; Proper Body Mechanics  Patient's Response to Education: Verbalized Understanding; Returned Demonstration    Equipment used: RW  Demonstrates functional use    Therapist comments: Pt received semi supine in bed, pleasant and cooperative for OT session. Pt agreeable for OOB activity in preparation for ADLs and functional transfers. Pt performs bed mobility at mod I to sit EOB. While sitting EOB, pt demos footwear management at mod I. Pt then requests to use the bathroom and ambulates to toilet with RW requiring supervision. All aspects of toileting including clothing management performed at supervision/mod I. Hand washing in standing performed at supervision and pt transfers back to bedside chair requiring supervision. All needs met.    Patient End of Session: Up in chair;Needs met;Call light within reach;RN aware of session/findings;All patient questions and concerns addressed;Hospital anti-slip socks;Alarm set    SUBJECTIVE  Pt pleasant and cooperative for OT.    PAIN ASSESSMENT  Ratin  Location: denies  Management Techniques: Nurse notified     OBJECTIVE  Precautions: Bed/chair alarm (o2 checks)    AM-PAC ‘6-Clicks’ Inpatient Daily Activity Short Form  -   Putting on and taking off regular lower body clothing?: None  -   Bathing (including washing, rinsing, drying)?: A Little  -   Toileting, which includes using toilet, bedpan  or urinal? : None  -   Putting on and taking off regular upper body clothing?: None  -   Taking care of personal grooming such as brushing teeth?: None  -   Eating meals?: None    AM-PAC Score:  Score: 23  Approx Degree of Impairment: 15.86%  Standardized Score (AM-PAC Scale): 51.12    PLAN  OT Device Recommendations: TBD  OT Treatment Plan: Balance activities;Energy conservation/work simplification techniques;ADL training;Functional transfer training;UE strengthening/ROM;Endurance training;Patient/Family education;Patient/Family training;Cognitive reorientation;Equipment eval/education;Compensatory technique education  Rehab Potential : Good  Frequency: 3-5x/week    OT Goals:     ADL Goals  Patient will perform toileting with supervision and AE PRN- goal met 01/16  Patient will perform LB dressing with supervision and AE PRN- goal met 01/16     Functional Transfer Goals  Patient will perform bed mobility supine to sit with supervision- goal met 01/16  Patient will perform bed mobility sit to supine with supervision- goal met 01/16  Patient will perform toilet transfer with supervision- goal met 01/16     Additional Goals:  Patient will state precautions and maintain during ADL    OT Session Time: 15 minutes  Self-Care Home Management: 15 minutes

## 2025-01-17 VITALS
DIASTOLIC BLOOD PRESSURE: 71 MMHG | HEIGHT: 70 IN | SYSTOLIC BLOOD PRESSURE: 135 MMHG | BODY MASS INDEX: 23.34 KG/M2 | RESPIRATION RATE: 18 BRPM | TEMPERATURE: 99 F | OXYGEN SATURATION: 89 % | WEIGHT: 163 LBS | HEART RATE: 78 BPM

## 2025-01-17 PROCEDURE — 99239 HOSP IP/OBS DSCHRG MGMT >30: CPT | Performed by: HOSPITALIST

## 2025-01-17 RX ORDER — MAGNESIUM OXIDE 400 MG/1
400 TABLET ORAL ONCE
Status: COMPLETED | OUTPATIENT
Start: 2025-01-17 | End: 2025-01-17

## 2025-01-17 RX ORDER — PREDNISONE 5 MG/1
TABLET ORAL
Qty: 45 TABLET | Refills: 0 | Status: SHIPPED | OUTPATIENT
Start: 2025-01-17 | End: 2025-01-29

## 2025-01-17 RX ORDER — ONDANSETRON 4 MG/1
4 TABLET, ORALLY DISINTEGRATING ORAL EVERY 4 HOURS PRN
Qty: 30 TABLET | Refills: 0 | Status: SHIPPED | OUTPATIENT
Start: 2025-01-17

## 2025-01-17 NOTE — CM/SW NOTE
01/17/25 1244   Mobility   O2 walk? Yes   SPO2% on Room Air at Rest 92   SPO2% Ambulation on Room Air 94     Noted o2 walk completed by nursing staff. No home oxygen need identified. RN contacted to follow up for any additional dc needs.     CM/SW will remain available for DC planning and/or support.     KRISTY TalleyN, CMSRN    [id number]

## 2025-01-17 NOTE — DISCHARGE SUMMARY
[place] HOSPITALIST  DISCHARGE SUMMARY     Georgia Thompson Patient Status:  Inpatient    1945 MRN [id number]   Location [facility] 3NE-A Attending Timur Perdomo, DO   Hosp Day # 10 PCP FREDRICK TONY MD     Date of Admission: 2025  Date of Discharge:   2025    Discharge Disposition: Home Health Care Services    Discharge Diagnosis:  #Acute hypoxemic respiratory failure  O2 needs cont to improve, will do formal home o2 eval prior to dc  Incentive spirometer     #Influenza A  #Acute bronchospasm  S/p Tamiflu course  cont Iv solumedrol for now; hopefully transition to po prednisone tomorrow  Prednisone on dc  PRN ANNE     #Alcohol abuse disorder     #Acute blood loss anemia  S/p EGD & colonoscopy on  which showed a large hiatal hernia     #Falls  #Weakness     #Anxiety  #Depression     #Incidental T12/L1 compression deformity  Asymptomatic    History of Present Illness: Georgia Thompson is a 79 year old female with PMHx PMHx alcohol abuse disorder, anxiety and depression to the hospital with headache, dizziness, generalized weakness and dark stool.  She had a fall last week when she slipped and hit her head.  She denies any loss of consciousness, neck pain or back pain.  She has had intermittent headaches since then but worsened yesterday.  She reports her last drink was on New Year's.  She has intermittent nausea with a few episodes of hematemesis 1 week ago.  She states she stopped heavy drinking 2.5 years ago.  She denies any abdominal pain.  In the ER she had a potassium of 3.1.  Hemoglobin was 7.4, down from 12.3 in 2024.  X-ray showed stable large hiatal hernia.  CT head was negative for acute surgical process.  CT abdomen his pelvis showed no acute process.  Patient was given 1 L normal saline bolus, potassium, IV PPI and Zofran and subsequently admitted with GI consult.     Brief Synopsis: pt initially admitted for ABLA, seen by GI, s/p EGD, details as above. Anemia stabilized, s/p iron.  Pt hgb stabilized. Pt's hospitalization c/b episode of flu causing acute resp failure and bronchospasm. Improved s/p iv steroids, ANNE and tamiflu. Pt now on RA. Stable for dc on po taper prednisone.     Lace+ Score: 56  59-90 High Risk  29-58 Medium Risk  0-28   Low Risk       TCM Follow-Up Recommendation:  LACE 29-58: Moderate Risk of readmission after discharge from the hospital.      Procedures during hospitalization:       ENDOSCOPY OPERATIVE REPORT     Patient Name:                  Georgia Thompson  Medical Record #:           [id number]  YOB: 1945  Date of Procedure:          1/8/2025     Preoperative Diagnosis:  hiatal hernia, iron deficiency anemia     Postoperative Diagnosis:   1) mild schatzki's ring.  Large hiatal hernia with possible para-esophageal component.  Non bleeding virginia's erosions and gastritis     2) nl distal ti, pan-diverticulosis, 3 polyps from 4-7 mm.      Procedure Performed:Esophagogastroduodenoscopy with biopsy                                             Colonoscopy with snare        Incidental or significant findings and recommendations (brief descriptions):      Lab/Test results pending at Discharge:       Consultants:  GI    Discharge Medication List:     Discharge Medications        START taking these medications        Instructions Prescription details   predniSONE 5 MG Tabs  Commonly known as: Deltasone  Start taking on: January 16, 2025      Take 8 tablets (40 mg total) by mouth daily for 3 days, THEN 4 tablets (20 mg total) daily for 3 days, THEN 2 tablets (10 mg total) daily for 3 days, THEN 1 tablet (5 mg total) daily for 3 days.   Stop taking on: January 28, 2025  Quantity: 45 tablet  Refills: 0            CONTINUE taking these medications        Instructions Prescription details   ALPRAZolam 1 MG Tabs  Commonly known as: Xanax      Take 1 tablet (1 mg total) by mouth 3 (three) times daily.   Refills: 0     escitalopram 20 MG Tabs  Commonly  known as: Lexapro      Take 1 tablet (20 mg total) by mouth daily.   Quantity: 30 tablet  Refills: 0     folic acid 1 MG Tabs  Commonly known as: Folvite      Take 1 tablet (1 mg total) by mouth daily.   Quantity: 30 tablet  Refills: 0     ondansetron 4 MG Tbdp  Commonly known as: Zofran-ODT      Take 1 tablet (4 mg total) by mouth every 4 (four) hours as needed for Nausea.   Quantity: 30 tablet  Refills: 0     pantoprazole 40 MG Tbec  Commonly known as: Protonix      Take 1 tablet (40 mg total) by mouth every morning before breakfast.   Quantity: 30 tablet  Refills: 5     thiamine 100 MG Tabs  Commonly known as: Vitamin B1      Take 1 tablet (100 mg total) by mouth daily.   Quantity: 30 tablet  Refills: 0            STOP taking these medications      HYDROcodone-acetaminophen 5-325 MG Tabs  Commonly known as: Norco        metoclopramide 10 MG Tabs  Commonly known as: Reglan                  Where to Get Your Medications        These medications were sent to [facility] [address] [place] [address] PHILIPP [phone], [phone]  [facility] PHILIPP [facility] [address]      Phone: [phone]   ondansetron 4 MG Tbdp  predniSONE 5 MG Tabs         [facility]     Follow-up appointment:   Fernie Rendon MD  [facility] [address]   [address]  [facility] [address] [phone]    Schedule an appointment as soon as possible for a visit in 1 week(s)  You have requested to schedule your own appointment for your follow-up.  Please call  as soon as possible to schedule an appointment    Carlos aRngel  [address] [facility] [address] [phone]    Follow up in 1 week(s)  You have requested to schedule your own appointment for your follow-up.  Please call 's as soon as possible to schedule an appointment    Appointments for Next 30 Days 1/17/2025 - 2/16/2025      None            Vital signs:  Temp:  [97.5 °F (36.4 °C)-98.6 °F (37 °C)] 98.6 °F (37 °C)  Pulse:  [68-82] 78  Resp:  [18] 18  BP:  (135-144)/(70-80) 135/71  SpO2:  [88 %-94 %] 89 %    Physical Exam:    General: No acute distress   Lungs: clear to auscultation  Cardiovascular: S1, S2  Abdomen: Soft      -----------------------------------------------------------------------------------------------  PATIENT DISCHARGE INSTRUCTIONS: See electronic chart    Timur Perdomo DO    Total time spent on discharge plannin minutes     The  [employer] Cures Act makes medical notes like these available to patients in the interest of transparency. Please be advised this is a medical document. Medical documents are intended to carry relevant information, facts as evident, and the clinical opinion of the practitioner. The medical note is intended as peer to peer communication and may appear blunt or direct. It is written in medical language and may contain abbreviations or verbiage that are unfamiliar.

## 2025-01-17 NOTE — PROGRESS NOTES
NURSING DISCHARGE NOTE    Discharged Home via Wheelchair.  Accompanied by Support staff  Belongings Taken by patient/family.    Discharged instructions discussed with patient.  Instructed to take all medications as directed.  Instructed to follow up with her primary physician.  Patient meds to beds Prednisone taken by patient at home.  IV/telemetry removed.  Vital signs are within parameters, no complaint and verbalized good understanding of the instructions.

## 2025-01-17 NOTE — CM/SW NOTE
01/17/25 1400   Discharge disposition   Expected discharge disposition Home-Health   Post Acute Care Provider   ([facility])     [facility] notified of dc for today.    Tyesha Meyer LCSW  /Discharge Planner

## 2025-01-17 NOTE — PROGRESS NOTES
#Acute [facility]   part of [facility]     Hospitalist Progress Note     Georgia Thompson Patient Status:  Inpatient    1945 MRN [id number]   Location [facility] 3NE-A Attending Hussein Dorado MD   Hosp Day # 10 PCP CHAVO MCHUGH, FREDRICK     Chief Complaint: I am wheezing    Subjective:     No longer wheezing, on RA. Sob on exertion improving. Possible home later today.     Objective:    Review of Systems:   A comprehensive review of systems was completed; pertinent positive and negatives stated in subjective.    Vital signs:  Temp:  [97.5 °F (36.4 °C)-98.6 °F (37 °C)] 98.4 °F (36.9 °C)  Pulse:  [68-79] 75  Resp:  [18] 18  BP: (135-146)/(70-80) 144/80  SpO2:  [88 %-94 %] 91 %    Physical Exam:    General: No acute distress  Respiratory: positive wheezes, no rhonchi  Cardiovascular: S1, S2, regular rate and rhythm  Abdomen: Soft, Non-tender, non-distended, positive bowel sounds  Neuro: No new focal deficits.   Extremities: No edema      Diagnostic Data:    Labs:  Recent Labs   Lab 25  0748 25  0751   WBC 4.1 10.7   HGB 7.8* 8.6*   MCV 83.6 84.0   .0 275.0       Recent Labs   Lab 25  0748 25  1729 25  0801 25  0751   GLU 94  --   --  140*   BUN 5*  --   --  13   CREATSERUM 0.67  --   --  0.60   CA 8.3*  --   --  9.1   ALB 3.2  --   --  3.5     --   --  139   K 2.8* 3.3* 3.5 4.2     --   --  103   CO2 34.0*  --   --  29.0   ALKPHO 44*  --   --  50*   AST 18  --   --  22   ALT <7*  --   --  11   BILT 0.3  --   --  0.3   TP 5.3*  --   --  6.1       Estimated Glomerular Filtration Rate: 92.6 mL/min/1.73m2 (by CKD-EPI based on SCr of 0.6 mg/dL).    No results for input(s): \"TROP\", \"TROPHS\", \"CK\" in the last 168 hours.    No results for input(s): \"PTP\", \"INR\" in the last 168 hours.               Microbiology    Hospital Encounter on 25   1. Blood Culture     Status: None    Collection Time: 25  4:31 PM    Specimen: Blood,peripheral   Result  Value Ref Range    Blood Culture Result No Growth 5 Days N/A         Imaging: Reviewed in Epic.    Medications:    famotidine  20 mg Oral BID    methylPREDNISolone  40 mg Intravenous Q8H    guaiFENesin ER  600 mg Oral BID    pantoprazole  40 mg Oral QAM AC    enoxaparin  40 mg Subcutaneous Nightly    escitalopram  20 mg Oral QAM    folic acid  1 mg Oral Daily    thiamine  100 mg Oral Daily    multivitamin  1 tablet Oral Daily       Assessment & Plan:      #Dispo  -plan for dc later today  -home o2 eval prior to discharge    #Acute hypoxemic respiratory failure  O2 needs cont to improve, will do formal home o2 eval prior to dc  Incentive spirometer    #Influenza A  #Acute bronchospasm  S/p Tamiflu course  cont Iv solumedrol for now; hopefully transition to po prednisone tomorrow  Prednisone on dc  PRN ANNE    #Alcohol abuse disorder    #Acute blood loss anemia  S/p EGD and colonoscopy on 1/8 which showed a large hiatal hernia    #Falls  #Weakness    #Anxiety  #Depression    #Incidental T12/L1 compression deformity  Asymptomatic      Timur Perdomo,     Supplementary Documentation:     Quality:  DVT Mechanical Prophylaxis:   SCDs,    DVT Pharmacologic Prophylaxis   Medication    enoxaparin (Lovenox) 40 MG/0.4ML SUBQ injection 40 mg                Code Status: DNAR/Selective Treatment  Stafford: External urinary catheter in place  Stafford Duration (in days):   Central line:    KB: 1/17/2025    Discharge is dependent on: improvement in breathing  At this point Ms. Thompson is expected to be discharge to: home    The 21st Century Cures Act makes medical notes like these available to patients in the interest of transparency. Please be advised this is a medical document. Medical documents are intended to carry relevant information, facts as evident, and the clinical opinion of the practitioner. The medical note is intended as peer to peer communication and may appear blunt or direct. It is written in medical language and may  contain abbreviations or verbiage that are unfamiliar.

## 2025-01-17 NOTE — PLAN OF CARE
Assumed patient care @ 07:30.  Alert and oriented x 4.  Room air, O2 as needed.  NSR on telemetry.  Regular diet.  PIV RFA/SL  Safety fall precautions maintained.  Needs attended, call light within her reach.  Bed in lowest position, bed alarm is on.  Problem: METABOLIC/FLUID AND ELECTROLYTES - ADULT  Goal: Electrolytes maintained within normal limits  Description: INTERVENTIONS:  - Monitor labs and rhythm and assess patient for signs and symptoms of electrolyte imbalances  - Administer electrolyte replacement as ordered  - Monitor response to electrolyte replacements, including rhythm and repeat lab results as appropriate  - Fluid restriction as ordered  - Instruct patient on fluid and nutrition restrictions as appropriate  Outcome: Progressing     Problem: HEMATOLOGIC - ADULT  Goal: Maintains hematologic stability  Description: INTERVENTIONS  - Assess for signs and symptoms of bleeding or hemorrhage  - Monitor labs and vital signs for trends  - Administer supportive blood products/factors, fluids and medications as ordered and appropriate  - Administer supportive blood products/factors as ordered and appropriate  Outcome: Progressing  Goal: Free from bleeding injury  Description: (Example usage: patient with low platelets)  INTERVENTIONS:  - Avoid intramuscular injections, enemas and rectal medication administration  - Ensure safe mobilization of patient  - Hold pressure on venipuncture sites to achieve adequate hemostasis  - Assess for signs and symptoms of internal bleeding  - Monitor lab trends  - Patient is to report abnormal signs of bleeding to staff  - Avoid use of toothpicks and dental floss  - Use electric shaver for shaving  - Use soft bristle tooth brush  - Limit straining and forceful nose blowing  Outcome: Progressing     Problem: GASTROINTESTINAL - ADULT  Goal: Maintains or returns to baseline bowel function  Description: INTERVENTIONS:  - Assess bowel function  - Maintain adequate hydration with IV  or PO as ordered and tolerated  - Evaluate effectiveness of GI medications  - Encourage mobilization and activity  - Obtain nutritional consult as needed  - Establish a toileting routine/schedule  - Consider collaborating with pharmacy to review patient's medication profile  Outcome: Progressing     Problem: PAIN - ADULT  Goal: Verbalizes/displays adequate comfort level or patient's stated pain goal  Description: INTERVENTIONS:  - Encourage pt to monitor pain and request assistance  - Assess pain using appropriate pain scale  - Administer analgesics based on type and severity of pain and evaluate response  - Implement non-pharmacological measures as appropriate and evaluate response  - Consider cultural and social influences on pain and pain management  - Manage/alleviate anxiety  - Utilize distraction and/or relaxation techniques  - Monitor for opioid side effects  - Notify MD/LIP if interventions unsuccessful or patient reports new pain  - Anticipate increased pain with activity and pre-medicate as appropriate  Outcome: Progressing     Problem: SAFETY ADULT - FALL  Goal: Free from fall injury  Description: INTERVENTIONS:  - Assess pt frequently for physical needs  - Identify cognitive and physical deficits and behaviors that affect risk of falls.  - Seltzer fall precautions as indicated by assessment.  - Educate pt/family on patient safety including physical limitations  - Instruct pt to call for assistance with activity based on assessment  - Modify environment to reduce risk of injury  - Provide assistive devices as appropriate  - Consider OT/PT consult to assist with strengthening/mobility  - Encourage toileting schedule  Outcome: Progressing     Problem: DISCHARGE PLANNING  Goal: Discharge to home or other facility with appropriate resources  Description: INTERVENTIONS:  - Identify barriers to discharge w/pt and caregiver  - Include patient/family/discharge partner in discharge planning  - Arrange for needed  discharge resources and transportation as appropriate  - Identify discharge learning needs (meds, wound care, etc)  - Arrange for interpreters to assist at discharge as needed  - Consider post-discharge preferences of patient/family/discharge partner  - Complete POLST form as appropriate  - Assess patient's ability to be responsible for managing their own health  - Refer to Case Management Department for coordinating discharge planning if the patient needs post-hospital services based on physician/LIP order or complex needs related to functional status, cognitive ability or social support system  Outcome: Progressing

## (undated) DEVICE — V2 SPECIMEN COLLECTION MANIFOLD KIT: Brand: NEPTUNE

## (undated) DEVICE — BITEBLOCK ENDOSCP 60FR MAXI STRP

## (undated) DEVICE — LASSO POLYPECTOMY SNARE: Brand: LASSO

## (undated) DEVICE — 1200CC GUARDIAN II: Brand: GUARDIAN

## (undated) DEVICE — KIT VLV 5 PC AIR H2O SUCT BX ENDOGATOR CONN

## (undated) DEVICE — KIT CUSTOM ENDOPROCEDURE STERIS

## (undated) DEVICE — 10FT COMBINED O2 DELIVERY/CO2 MONITORING. FILTER WITH MICROSTREAM TYPE LUER: Brand: DUAL ADULT NASAL CANNULA

## (undated) DEVICE — 3M™ RED DOT™ MONITORING ELECTRODE WITH FOAM TAPE AND STICKY GEL, 50/BAG, 20/CASE, 72/PLT 2570: Brand: RED DOT™

## (undated) DEVICE — GIJAW SINGLE-USE BIOPSY FORCEPS WITH NEEDLE: Brand: GIJAW

## (undated) NOTE — LETTER
[address] [place] [address]  [address]  Authorization for Surgical Operation and Procedure     Date:___________                                                                                                         Time:__________  I hereby authorize Surgeon(s):  García Holt MD, my physician and his/her assistants (if applicable), which may include medical students, residents, and/or fellows, to perform the following surgical operation/ procedure and administer such anesthesia as may be determined necessary by my physician:  Operation/Procedure name (s) upper endoscopy with possible dilation, biopsy, control of bleeding on Georgia Thompson   2.   I recognize that during the surgical operation/procedure, unforeseen conditions may necessitate additional or different procedures than those listed above.  I, therefore, further authorize and request that the above-named surgeon, assistants, or designees perform such procedures as are, in their judgment, necessary and desirable.    3.   My surgeon/physician has discussed prior to my surgery the potential benefits, risks and side effects of this procedure; the likelihood of achieving goals; and potential problems that might occur during recuperation.  They also discussed reasonable alternatives to the procedure, including risks, benefits, and side effects related to the alternatives and risks related to not receiving this procedure.  I have had all my questions answered and I acknowledge that no guarantee has been made as to the result that may be obtained.    4.   Should the need arise during my operation/procedure, which includes change of level of care prior to discharge, I also consent to the administration of blood and/or blood products.  Further, I understand that despite careful testing and screening of blood or blood products by collecting agencies, I may still be subject to ill effects as a result of receiving a blood transfusion and/or  blood products.  The following are some, but not all, of the potential risks that can occur: fever and allergic reactions, hemolytic reactions, transmission of diseases such as Hepatitis, AIDS and Cytomegalovirus (CMV) and fluid overload.  In the event that I wish to have an autologous transfusion of my own blood, or a directed donor transfusion, I will discuss this with my physician.  Check only if Refusing Blood or Blood Products  I understand refusal of blood or blood products as deemed necessary by my physician may have serious consequences to my condition to include possible death. I hereby assume responsibility for my refusal and release the hospital, its personnel, and my physicians from any responsibility for the consequences of my refusal.          o  Refuse      5.   I authorize the use of any specimen, organs, tissues, body parts or foreign objects that may be removed from my body during the operation/procedure for diagnosis, research or teaching purposes and their subsequent disposal by hospital authorities.  I also authorize the release of specimen test results and/or written reports to my treating physician on the hospital medical staff or other referring or consulting physicians involved in my care, at the discretion of the Pathologist or my treating physician.    6.   I consent to the photographing or videotaping of the operations or procedures to be performed, including appropriate portions of my body for medical, scientific, or educational purposes, provided my identity is not revealed by the pictures or by descriptive texts accompanying them.  If the procedure has been photographed/videotaped, the surgeon will obtain the original picture, image, videotape or CD.  The hospital will not be responsible for storage, release or maintenance of the picture, image, tape or CD.    7.   I consent to the presence of a  or observers in the operating room as deemed necessary by my physician  or their designees.    8.   I recognize that in the event my procedure results in extended X-Ray/fluoroscopy time, I may develop a skin reaction.    9. If I have a Do Not Attempt Resuscitation (DNAR) order in place, that status will be suspended while in the operating room, procedural suite, and during the recovery period unless otherwise explicitly stated by me (or a person authorized to consent on my behalf). The surgeon or my attending physician will determine when the applicable recovery period ends for purposes of reinstating the DNAR order.  10. Patients having a sterilization procedure: I understand that if the procedure is successful the results will be permanent and it will therefore be impossible for me to inseminate, conceive, or bear children.  I also understand that the procedure is intended to result in sterility, although the result has not been guaranteed.   11. I acknowledge that my physician has explained sedation/analgesia administration to me including the risk and benefits I consent to the administration of sedation/analgesia as may be necessary or desirable in the judgment of my physician.    I CERTIFY THAT I HAVE READ AND FULLY UNDERSTAND THE ABOVE CONSENT TO OPERATION and/or OTHER PROCEDURE.    _________________________________________  __________________________________  Signature of Patient     Signature of Responsible Person         ___________________________________         Printed Name of Responsible Person           _________________________________                 Relationship to Patient  _________________________________________  ______________________________  Signature of Witness          Date  Time      Patient Name: Georgia Curranons     : 1945                 Printed: 2025     Medical Record #: [id number]                     Page 1 of [address] [place] [address]  [id number]    Consent for Anesthesia    I,  Georgia Thompson agree to be cared for by an anesthesiologist, who is specially trained to monitor me and give me medicine to put me to sleep or keep me comfortable during my procedure    I understand that my anesthesiologist is not an employee or agent of [facility] [email or web address]. He or she works for [employer].    As the patient asking for anesthesia services, I agree to:  Allow the anesthesiologist (anesthesia doctor) to give me medicine and do additional procedures as necessary. Some examples are: Starting or using an “IV” to give me medicine, fluids or blood during my procedure, and having a breathing tube placed to help me breathe when I’m asleep (intubation). In the event that my heart stops working properly, I understand that my anesthesiologist will make every effort to sustain my life, unless otherwise directed by [facility] Do Not Resuscitate documents.  Tell my anesthesia doctor before my procedure:  If I am pregnant.  The last time that I ate or drank.  All of the medicines I take (including prescriptions, herbal supplements, and pills I can buy without a prescription (including street drugs/illegal medications). Failure to inform my anesthesiologist about these medicines may increase my risk of anesthetic complications.  If I am allergic to anything or have had a reaction to anesthesia before.  I understand how the anesthesia medicine will help me (benefits).  I understand that with any type of anesthesia medicine there are risks:  The most common risks are: nausea, vomiting, sore throat, muscle soreness, damage to my eyes, mouth, or teeth (from breathing tube placement).  Rare risks include: remembering what happened during my procedure, allergic reactions to medications, injury to my airway, heart, lungs, vision, nerves, or muscles and in extremely rare instances death.  My doctor has explained to me other choices available to me for my care  (alternatives).  Pregnant Patients (“epidural”):  I understand that the risks of having an epidural (medicine given into my back to help control pain during labor), include itching, low blood pressure, difficulty urinating, headache or slowing of the baby’s heart. Very rare risks include infection, bleeding, seizure, irregular heart rhythms and nerve injury.  Regional Anesthesia (“spinal”, “epidural”, & “nerve blocks”):  I understand that rare but potential complications include headache, bleeding, infection, seizure, irregular heart rhythms, and nerve injury.    I can change my mind about having anesthesia services at any time before I get the medicine.    _____________________________________________________________________________  Patient (or Representative) Signature/Relationship to Patient  Date   Time    _____________________________________________________________________________   Name (if used)    Language/Organization   Time    _____________________________________________________________________________  Anesthesiologist Signature     Date   Time  I have discussed the procedure and information above with the patient (or patient’s representative) and answered their questions. The patient or their representative has agreed to have anesthesia services.    _____________________________________________________________________________  Witness        Date   Time  I have verified that the signature is that of the patient or patient’s representative, and that it was signed before the procedure  Patient Name: Georgia Thompson     : 1945                 Printed: 2025     Medical Record #: [id number]                     Page 2 of 2

## (undated) NOTE — LETTER
[address]  [address]  Authorization for Surgical Operation and Procedure     Date:___________                                                                                                         Time:__________  I hereby authorize Surgeon(s):  García Holt MD, my physician and his/her assistants (if applicable), which may include medical students, residents, and/or fellows, to perform the following surgical operation/ procedure and administer such anesthesia as may be determined necessary by my physician:  Operation/Procedure name (s) colonoscopy with possible biopsy, control of bleeding, polyp removal on Georgia Thompson   2.   I recognize that during the surgical operation/procedure, unforeseen conditions may necessitate additional or different procedures than those listed above.  I, therefore, further authorize and request that the above-named surgeon, assistants, or designees perform such procedures as are, in their judgment, necessary and desirable.    3.   My surgeon/physician has discussed prior to my surgery the potential benefits, risks and side effects of this procedure; the likelihood of achieving goals; and potential problems that might occur during recuperation.  They also discussed reasonable alternatives to the procedure, including risks, benefits, and side effects related to the alternatives and risks related to not receiving this procedure.  I have had all my questions answered and I acknowledge that no guarantee has been made as to the result that may be obtained.    4.   Should the need arise during my operation/procedure, which includes change of level of care prior to discharge, I also consent to the administration of blood and/or blood products.  Further, I understand that despite careful testing and screening of blood or blood products by collecting agencies, I may still be subject to ill effects as a result of receiving a blood transfusion and/or  blood products.  The following are some, but not all, of the potential risks that can occur: fever and allergic reactions, hemolytic reactions, transmission of diseases such as Hepatitis, AIDS and Cytomegalovirus (CMV) and fluid overload.  In the event that I wish to have an autologous transfusion of my own blood, or a directed donor transfusion, I will discuss this with my physician.  Check only if Refusing Blood or Blood Products  I understand refusal of blood or blood products as deemed necessary by my physician may have serious consequences to my condition to include possible death. I hereby assume responsibility for my refusal and release the hospital, its personnel, and my physicians from any responsibility for the consequences of my refusal.          o  Refuse      5.   I authorize the use of any specimen, organs, tissues, body parts or foreign objects that may be removed from my body during the operation/procedure for diagnosis, research or teaching purposes and their subsequent disposal by hospital authorities.  I also authorize the release of specimen test results and/or written reports to my treating physician on the hospital medical staff or other referring or consulting physicians involved in my care, at the discretion of the Pathologist or my treating physician.    6.   I consent to the photographing or videotaping of the operations or procedures to be performed, including appropriate portions of my body for medical, scientific, or educational purposes, provided my identity is not revealed by the pictures or by descriptive texts accompanying them.  If the procedure has been photographed/videotaped, the surgeon will obtain the original picture, image, videotape or CD.  The hospital will not be responsible for storage, release or maintenance of the picture, image, tape or CD.    7.   I consent to the presence of a  or observers in the operating room as deemed necessary by my physician  or their designees.    8.   I recognize that in the event my procedure results in extended X-Ray/fluoroscopy time, I may develop a skin reaction.    9. If I have a Do Not Attempt Resuscitation (DNAR) order in place, that status will be suspended while in the operating room, procedural suite, and during the recovery period unless otherwise explicitly stated by me (or a person authorized to consent on my behalf). The surgeon or my attending physician will determine when the applicable recovery period ends for purposes of reinstating the DNAR order.  10. Patients having a sterilization procedure: I understand that if the procedure is successful the results will be permanent and it will therefore be impossible for me to inseminate, conceive, or bear children.  I also understand that the procedure is intended to result in sterility, although the result has not been guaranteed.   11. I acknowledge that my physician has explained sedation/analgesia administration to me including the risk and benefits I consent to the administration of sedation/analgesia as may be necessary or desirable in the judgment of my physician.    I CERTIFY THAT I HAVE READ AND FULLY UNDERSTAND THE ABOVE CONSENT TO OPERATION and/or OTHER PROCEDURE.    _________________________________________  __________________________________  Signature of Patient     Signature of Responsible Person         ___________________________________         Printed Name of Responsible Person           _________________________________                 Relationship to Patient  _________________________________________  ______________________________  Signature of Witness          Date  Time      Patient Name: Georgia Curranons     : 1945                 Printed: 2025     Medical Record #: [id number]                     Page 1 of [address] [place] [address]  [address]    Consent for Anesthesia    I,  Georgia Thompson agree to be cared for by an anesthesiologist, who is specially trained to monitor me and give me medicine to put me to sleep or keep me comfortable during my procedure    I understand that my anesthesiologist is not an employee or agent of [facility] [employer]. He or she works for [employer].    As the patient asking for anesthesia services, I agree to:  Allow the anesthesiologist (anesthesia doctor) to give me medicine and do additional procedures as necessary. Some examples are: Starting or using an “IV” to give me medicine, fluids or blood during my procedure, and having a breathing tube placed to help me breathe when I’m asleep (intubation). In the event that my heart stops working properly, I understand that my anesthesiologist will make every effort to sustain my life, unless otherwise directed by [facility] Do Not Resuscitate documents.  Tell my anesthesia doctor before my procedure:  If I am pregnant.  The last time that I ate or drank.  All of the medicines I take (including prescriptions, herbal supplements, and pills I can buy without a prescription (including street drugs/illegal medications). Failure to inform my anesthesiologist about these medicines may increase my risk of anesthetic complications.  If I am allergic to anything or have had a reaction to anesthesia before.  I understand how the anesthesia medicine will help me (benefits).  I understand that with any type of anesthesia medicine there are risks:  The most common risks are: nausea, vomiting, sore throat, muscle soreness, damage to my eyes, mouth, or teeth (from breathing tube placement).  Rare risks include: remembering what happened during my procedure, allergic reactions to medications, injury to my airway, heart, lungs, vision, nerves, or muscles and in extremely rare instances death.  My doctor has explained to me other choices available to me for my care  (alternatives).  Pregnant Patients (“epidural”):  I understand that the risks of having an epidural (medicine given into my back to help control pain during labor), include itching, low blood pressure, difficulty urinating, headache or slowing of the baby’s heart. Very rare risks include infection, bleeding, seizure, irregular heart rhythms and nerve injury.  Regional Anesthesia (“spinal”, “epidural”, & “nerve blocks”):  I understand that rare but potential complications include headache, bleeding, infection, seizure, irregular heart rhythms, and nerve injury.    I can change my mind about having anesthesia services at any time before I get the medicine.    _____________________________________________________________________________  Patient (or Representative) Signature/Relationship to Patient  Date   Time    _____________________________________________________________________________   Name (if used)    Language/Organization   Time    _____________________________________________________________________________  Anesthesiologist Signature     Date   Time  I have discussed the procedure and information above with the patient (or patient’s representative) and answered their questions. The patient or their representative has agreed to have anesthesia services.    _____________________________________________________________________________  Witness        Date   Time  I have verified that the signature is that of the patient or patient’s representative, and that it was signed before the procedure  Patient Name: Georgia Thompson     : 1945                 Printed: 2025     Medical Record #: [id number]                     Page 2 of 2

## (undated) NOTE — LETTER
[facility]NE-A  [address] [address]  [phone]    Blood Transfusion Consent    In the course of your treatment, it may become necessary to administer a transfusion of blood or blood components. This form provides basic information concerning this procedure and, if signed by you, authorizes its administration. By signing this form, you agree that all of your questions about the administration of blood or blood products have been answered by the ordering medical professional or designee.    Description of Procedure  Blood is introduced into one of your veins, commonly in the arm, using a sterilized disposable needle. The amount of blood transfused, and whether the transfusion will be of blood or blood components is a judgement the physician will make based on your particular needs.    Risks  The transfusion is a common procedure of low risk.  MINOR AND TEMPORARY REACTIONS ARE NOT UNCOMMON, including a slight bruise, swelling or local reaction in the area where the needle pierces your skin, or a nonserious reaction to the transfused material itself, including headache, fever or mild skin reaction, such as rash.  Serious reactions are possible, though very unlikely, and include severe allergic reaction (shock) and destruction (hemolysis) of transfused blood cells.  Infectious diseases which are known to be transmitted by blood transfusion include certain types of viral Hepatitis(liver infection from a virus), Human Immunodeficiency Virus (HIV-1,2) infection, a viral infection known to cause Acquired Immunodeficiency Syndrome (AIDS), as well as certain other bacterial, viral, and parasitic diseases. While a minimal risk of acquiring an infectious disease from transfused blood exists, in accordance with the Federal and State law, all due care has been taken in donor selection and testing to avoid transmission of disease.    Alternatives  If loss of blood poses serious threats during your  treatment, THERE IS NO EFFECTIVE ALTERNATIVE TO BLOOD TRANSFUSION. However, if you have any further questions on this matter, your provider will fully explain the alternatives to you if it has not already been done.    I, ______________________________, have read/had read to me the above. I understand the matters bearing on the decision whether or not to authorize a transfusion of blood or blood components. I have no questions which have not been answered to my full satisfaction. I hereby consent to such transfusion as my physician may deem necessary or advisable in the course of my treatment.    ______________________________________________                    ___________________________  (Signature of Patient or Responsible party in case of minor,                 (Printed Name of Patient or incompetent, or unconscious patient)              Responsible Party)    ___________________________               _____________________  (Relationship to Patient if not self)                                    (Date and Time)    __________________________                                                           ______________________              (Signature of Witness)               (Printed Name of Witness)     Language line ()    Telephone/Verbal/Video Consent    __________________________                     ____________________  (Signature of 2nd Witness           (Printed Name of 2nd  Telephone/Verbal/Video Consent)           Witness)    Patient Name: Georgia Thompson     : 1945                 Printed: 2025     Medical Record #: [id number]      Rev: [id number]  [id number]  Authorization for Surgical Operation and Procedure     Date:___________                                                                                                         Time:__________  I hereby authorize Surgeon(s):  García Holt MD, my physician and his/her  assistants (if applicable), which may include medical students, residents, and/or fellows, to perform the following surgical operation/ procedure and administer such anesthesia as may be determined necessary by my physician:  Operation/Procedure name (s) Procedure(s):  ESOPHAGOGASTRODUODENOSCOPY (EGD), COLONOSCOPY  COLONOSCOPY on Georgia Thompson   2.   I recognize that during the surgical operation/procedure, unforeseen conditions may necessitate additional or different procedures than those listed above.  I, therefore, further authorize and request that the above-named surgeon, assistants, or designees perform such procedures as are, in their judgment, necessary and desirable.    3.   My surgeon/physician has discussed prior to my surgery the potential benefits, risks and side effects of this procedure; the likelihood of achieving goals; and potential problems that might occur during recuperation.  They also discussed reasonable alternatives to the procedure, including risks, benefits, and side effects related to the alternatives and risks related to not receiving this procedure.  I have had all my questions answered and I acknowledge that no guarantee has been made as to the result that may be obtained.    4.   Should the need arise during my operation/procedure, which includes change of level of care prior to discharge, I also consent to the administration of blood and/or blood products.  Further, I understand that despite careful testing and screening of blood or blood products by collecting agencies, I may still be subject to ill effects as a result of receiving a blood transfusion and/or blood products.  The following are some, but not all, of the potential risks that can occur: fever and allergic reactions, hemolytic reactions, transmission of diseases such as Hepatitis, AIDS and Cytomegalovirus (CMV) and fluid overload.  In the event that I wish to have an autologous transfusion of my own blood, or a directed  donor transfusion, I will discuss this with my physician.  Check only if Refusing Blood or Blood Products  I understand refusal of blood or blood products as deemed necessary by my physician may have serious consequences to my condition to include possible death. I hereby assume responsibility for my refusal and release the hospital, its personnel, and my physicians from any responsibility for the consequences of my refusal.          o  Refuse      5.   I authorize the use of any specimen, organs, tissues, body parts or foreign objects that may be removed from my body during the operation/procedure for diagnosis, research or teaching purposes and their subsequent disposal by hospital authorities.  I also authorize the release of specimen test results and/or written reports to my treating physician on the hospital medical staff or other referring or consulting physicians involved in my care, at the discretion of the Pathologist or my treating physician.    6.   I consent to the photographing or videotaping of the operations or procedures to be performed, including appropriate portions of my body for medical, scientific, or educational purposes, provided my identity is not revealed by the pictures or by descriptive texts accompanying them.  If the procedure has been photographed/videotaped, the surgeon will obtain the original picture, image, videotape or CD.  The hospital will not be responsible for storage, release or maintenance of the picture, image, tape or CD.    7.   I consent to the presence of a  or observers in the operating room as deemed necessary by my physician or their designees.    8.   I recognize that in the event my procedure results in extended X-Ray/fluoroscopy time, I may develop a skin reaction.    9. If I have a Do Not Attempt Resuscitation (DNAR) order in place, that status will be suspended while in the operating room, procedural suite, and during the recovery period unless  otherwise explicitly stated by me (or a person authorized to consent on my behalf). The surgeon or my attending physician will determine when the applicable recovery period ends for purposes of reinstating the DNAR order.  10. Patients having a sterilization procedure: I understand that if the procedure is successful the results will be permanent and it will therefore be impossible for me to inseminate, conceive, or bear children.  I also understand that the procedure is intended to result in sterility, although the result has not been guaranteed.   11. I acknowledge that my physician has explained sedation/analgesia administration to me including the risk and benefits I consent to the administration of sedation/analgesia as may be necessary or desirable in the judgment of my physician.    I CERTIFY THAT I HAVE READ AND FULLY UNDERSTAND THE ABOVE CONSENT TO OPERATION and/or OTHER PROCEDURE.    _________________________________________  __________________________________  Signature of Patient     Signature of Responsible Person         ___________________________________         Printed Name of Responsible Person           _________________________________                 Relationship to Patient  _________________________________________  ______________________________  Signature of Witness          Date  Time      Patient Name: Georgia Thompson     : 1945                 Printed: 2025     Medical Record #: [id number]                     Page 2 of [address] [place] [address]  [id number]    Consent for Anesthesia    I, Georgia Thompson agree to be cared for by an anesthesiologist, who is specially trained to monitor me and give me medicine to put me to sleep or keep me comfortable during my procedure    I understand that my anesthesiologist is not an employee or agent of [facility]. He or she works for [employer]    As the patient asking for anesthesia services, I agree to:  Allow the anesthesiologist (anesthesia doctor) to give me medicine and do additional procedures as necessary. Some examples are: Starting or using an “IV” to give me medicine, fluids or blood during my procedure, and having a breathing tube placed to help me breathe when I’m asleep (intubation). In the event that my heart stops working properly, I understand that my anesthesiologist will make every effort to sustain my life, unless otherwise directed by [facility] Do Not Resuscitate documents.  Tell my anesthesia doctor before my procedure:  If I am pregnant.  The last time that I ate or drank.  All of the medicines I take (including prescriptions, herbal supplements, and pills I can buy without a prescription (including street drugs/illegal medications). Failure to inform my anesthesiologist about these medicines may increase my risk of anesthetic complications.  If I am allergic to anything or have had a reaction to anesthesia before.  I understand how the anesthesia medicine will help me (benefits).  I understand that with any type of anesthesia medicine there are risks:  The most common risks are: nausea, vomiting, sore throat, muscle soreness, damage to my eyes, mouth, or teeth (from breathing tube placement).  Rare risks include: remembering what happened during my procedure, allergic reactions to medications, injury to my airway, heart, lungs, vision, nerves, or muscles and in extremely rare instances death.  My doctor has explained to me other choices available to me for my care (alternatives).  Pregnant Patients (“epidural”):  I understand that the risks of having an epidural (medicine given into my back to help control pain during labor), include itching, low blood pressure, difficulty urinating, headache or slowing of the baby’s heart. Very rare risks include infection, bleeding, seizure, irregular heart rhythms  and nerve injury.  Regional Anesthesia (“spinal”, “epidural”, & “nerve blocks”):  I understand that rare but potential complications include headache, bleeding, infection, seizure, irregular heart rhythms, and nerve injury.    I can change my mind about having anesthesia services at any time before I get the medicine.    _____________________________________________________________________________  Patient (or Representative) Signature/Relationship to Patient  Date   Time    _____________________________________________________________________________   Name (if used)    Language/Organization   Time    _____________________________________________________________________________  Anesthesiologist Signature     Date   Time  I have discussed the procedure and information above with the patient (or patient’s representative) and answered their questions. The patient or their representative has agreed to have anesthesia services.    _____________________________________________________________________________  Witness        Date   Time  I have verified that the signature is that of the patient or patient’s representative, and that it was signed before the procedure  Patient Name: Georgia Thompson     : 1945                 Printed: 2025     Medical Record #: [id number]                     Page 3 of 3